# Patient Record
Sex: MALE | Race: WHITE | NOT HISPANIC OR LATINO | Employment: FULL TIME | URBAN - METROPOLITAN AREA
[De-identification: names, ages, dates, MRNs, and addresses within clinical notes are randomized per-mention and may not be internally consistent; named-entity substitution may affect disease eponyms.]

---

## 2021-05-28 ENCOUNTER — HOSPITAL ENCOUNTER (EMERGENCY)
Facility: HOSPITAL | Age: 31
Discharge: HOME | End: 2021-05-28
Attending: EMERGENCY MEDICINE
Payer: COMMERCIAL

## 2021-05-28 VITALS
HEART RATE: 100 BPM | HEIGHT: 71 IN | OXYGEN SATURATION: 98 % | WEIGHT: 200 LBS | TEMPERATURE: 99 F | RESPIRATION RATE: 18 BRPM | BODY MASS INDEX: 28 KG/M2 | DIASTOLIC BLOOD PRESSURE: 76 MMHG | SYSTOLIC BLOOD PRESSURE: 137 MMHG

## 2021-05-28 DIAGNOSIS — R07.9 CHEST PAIN: Primary | ICD-10-CM

## 2021-05-28 DIAGNOSIS — R73.9 HYPERGLYCEMIA: ICD-10-CM

## 2021-05-28 DIAGNOSIS — R05.9 COUGH: ICD-10-CM

## 2021-05-28 LAB
ALBUMIN SERPL BCP-MCNC: 3.6 G/DL (ref 3.5–5.2)
ALBUMIN SERPL BCP-MCNC: 4.6 G/DL (ref 3.5–5.2)
ALLENS TEST: ABNORMAL
ALP SERPL-CCNC: 104 U/L (ref 55–135)
ALP SERPL-CCNC: 82 U/L (ref 55–135)
ALT SERPL W/O P-5'-P-CCNC: 11 U/L (ref 10–44)
ALT SERPL W/O P-5'-P-CCNC: 13 U/L (ref 10–44)
ANION GAP SERPL CALC-SCNC: 14 MMOL/L (ref 8–16)
ANION GAP SERPL CALC-SCNC: 18 MMOL/L (ref 8–16)
AST SERPL-CCNC: 15 U/L (ref 10–40)
AST SERPL-CCNC: 9 U/L (ref 10–40)
BASOPHILS # BLD AUTO: 0.03 K/UL (ref 0–0.2)
BASOPHILS NFR BLD: 0.5 % (ref 0–1.9)
BILIRUB SERPL-MCNC: 0.7 MG/DL (ref 0.1–1)
BILIRUB SERPL-MCNC: 1.1 MG/DL (ref 0.1–1)
BNP SERPL-MCNC: <10 PG/ML (ref 0–99)
BUN SERPL-MCNC: 10 MG/DL (ref 6–20)
BUN SERPL-MCNC: 10 MG/DL (ref 6–20)
BUN SERPL-MCNC: 11 MG/DL (ref 6–30)
CALCIUM SERPL-MCNC: 10.4 MG/DL (ref 8.7–10.5)
CALCIUM SERPL-MCNC: 9.1 MG/DL (ref 8.7–10.5)
CHLORIDE SERPL-SCNC: 103 MMOL/L (ref 95–110)
CHLORIDE SERPL-SCNC: 98 MMOL/L (ref 95–110)
CHLORIDE SERPL-SCNC: 99 MMOL/L (ref 95–110)
CO2 SERPL-SCNC: 20 MMOL/L (ref 23–29)
CO2 SERPL-SCNC: 22 MMOL/L (ref 23–29)
CREAT SERPL-MCNC: 0.8 MG/DL (ref 0.5–1.4)
CREAT SERPL-MCNC: 0.9 MG/DL (ref 0.5–1.4)
CREAT SERPL-MCNC: 1.1 MG/DL (ref 0.5–1.4)
DELSYS: ABNORMAL
DIFFERENTIAL METHOD: ABNORMAL
EOSINOPHIL # BLD AUTO: 0.1 K/UL (ref 0–0.5)
EOSINOPHIL NFR BLD: 1.1 % (ref 0–8)
ERYTHROCYTE [DISTWIDTH] IN BLOOD BY AUTOMATED COUNT: 11.9 % (ref 11.5–14.5)
EST. GFR  (AFRICAN AMERICAN): >60 ML/MIN/1.73 M^2
EST. GFR  (AFRICAN AMERICAN): >60 ML/MIN/1.73 M^2
EST. GFR  (NON AFRICAN AMERICAN): >60 ML/MIN/1.73 M^2
EST. GFR  (NON AFRICAN AMERICAN): >60 ML/MIN/1.73 M^2
GLUCOSE SERPL-MCNC: 196 MG/DL (ref 70–110)
GLUCOSE SERPL-MCNC: 255 MG/DL (ref 70–110)
GLUCOSE SERPL-MCNC: 255 MG/DL (ref 70–110)
HCO3 UR-SCNC: 27.3 MMOL/L (ref 24–28)
HCT VFR BLD AUTO: 45.6 % (ref 40–54)
HCT VFR BLD CALC: 48 %PCV (ref 36–54)
HGB BLD-MCNC: 15.4 G/DL (ref 14–18)
IMM GRANULOCYTES # BLD AUTO: 0.02 K/UL (ref 0–0.04)
IMM GRANULOCYTES NFR BLD AUTO: 0.3 % (ref 0–0.5)
LYMPHOCYTES # BLD AUTO: 1.1 K/UL (ref 1–4.8)
LYMPHOCYTES NFR BLD: 17.1 % (ref 18–48)
MCH RBC QN AUTO: 30.6 PG (ref 27–31)
MCHC RBC AUTO-ENTMCNC: 33.8 G/DL (ref 32–36)
MCV RBC AUTO: 91 FL (ref 82–98)
MONOCYTES # BLD AUTO: 0.7 K/UL (ref 0.3–1)
MONOCYTES NFR BLD: 10.1 % (ref 4–15)
NEUTROPHILS # BLD AUTO: 4.7 K/UL (ref 1.8–7.7)
NEUTROPHILS NFR BLD: 70.9 % (ref 38–73)
NRBC BLD-RTO: 0 /100 WBC
PCO2 BLDA: 47.9 MMHG (ref 35–45)
PH SMN: 7.36 [PH] (ref 7.35–7.45)
PLATELET # BLD AUTO: 203 K/UL (ref 150–450)
PMV BLD AUTO: 11.6 FL (ref 9.2–12.9)
PO2 BLDA: 27 MMHG (ref 40–60)
POC BE: 2 MMOL/L
POC IONIZED CALCIUM: 1.2 MMOL/L (ref 1.06–1.42)
POC SATURATED O2: 48 % (ref 95–100)
POC TCO2 (MEASURED): 24 MMOL/L (ref 23–29)
POC TCO2: 29 MMOL/L (ref 24–29)
POTASSIUM BLD-SCNC: 4.4 MMOL/L (ref 3.5–5.1)
POTASSIUM SERPL-SCNC: 3.8 MMOL/L (ref 3.5–5.1)
POTASSIUM SERPL-SCNC: 4.4 MMOL/L (ref 3.5–5.1)
PROT SERPL-MCNC: 6.6 G/DL (ref 6–8.4)
PROT SERPL-MCNC: 8.4 G/DL (ref 6–8.4)
RBC # BLD AUTO: 5.04 M/UL (ref 4.6–6.2)
SAMPLE: ABNORMAL
SAMPLE: ABNORMAL
SITE: ABNORMAL
SODIUM BLD-SCNC: 138 MMOL/L (ref 136–145)
SODIUM SERPL-SCNC: 137 MMOL/L (ref 136–145)
SODIUM SERPL-SCNC: 139 MMOL/L (ref 136–145)
TROPONIN I SERPL DL<=0.01 NG/ML-MCNC: <0.006 NG/ML (ref 0–0.03)
WBC # BLD AUTO: 6.61 K/UL (ref 3.9–12.7)

## 2021-05-28 PROCEDURE — 96361 HYDRATE IV INFUSION ADD-ON: CPT

## 2021-05-28 PROCEDURE — 25500020 PHARM REV CODE 255: Performed by: PHYSICIAN ASSISTANT

## 2021-05-28 PROCEDURE — 84484 ASSAY OF TROPONIN QUANT: CPT | Performed by: PHYSICIAN ASSISTANT

## 2021-05-28 PROCEDURE — 93010 ELECTROCARDIOGRAM REPORT: CPT | Mod: ,,, | Performed by: INTERNAL MEDICINE

## 2021-05-28 PROCEDURE — 99285 EMERGENCY DEPT VISIT HI MDM: CPT | Mod: ,,, | Performed by: PHYSICIAN ASSISTANT

## 2021-05-28 PROCEDURE — 82803 BLOOD GASES ANY COMBINATION: CPT

## 2021-05-28 PROCEDURE — 83880 ASSAY OF NATRIURETIC PEPTIDE: CPT | Performed by: PHYSICIAN ASSISTANT

## 2021-05-28 PROCEDURE — 85025 COMPLETE CBC W/AUTO DIFF WBC: CPT | Performed by: PHYSICIAN ASSISTANT

## 2021-05-28 PROCEDURE — 93010 EKG 12-LEAD: ICD-10-PCS | Mod: ,,, | Performed by: INTERNAL MEDICINE

## 2021-05-28 PROCEDURE — 93005 ELECTROCARDIOGRAM TRACING: CPT

## 2021-05-28 PROCEDURE — 82330 ASSAY OF CALCIUM: CPT

## 2021-05-28 PROCEDURE — 99900035 HC TECH TIME PER 15 MIN (STAT)

## 2021-05-28 PROCEDURE — 99285 PR EMERGENCY DEPT VISIT,LEVEL V: ICD-10-PCS | Mod: ,,, | Performed by: PHYSICIAN ASSISTANT

## 2021-05-28 PROCEDURE — 80047 BASIC METABLC PNL IONIZED CA: CPT

## 2021-05-28 PROCEDURE — 63600175 PHARM REV CODE 636 W HCPCS: Performed by: PHYSICIAN ASSISTANT

## 2021-05-28 PROCEDURE — 80053 COMPREHEN METABOLIC PANEL: CPT | Performed by: PHYSICIAN ASSISTANT

## 2021-05-28 PROCEDURE — 96374 THER/PROPH/DIAG INJ IV PUSH: CPT | Mod: 59

## 2021-05-28 PROCEDURE — 99285 EMERGENCY DEPT VISIT HI MDM: CPT | Mod: 25

## 2021-05-28 RX ORDER — NAPROXEN 500 MG/1
500 TABLET ORAL 2 TIMES DAILY WITH MEALS
Qty: 20 TABLET | Refills: 0 | Status: SHIPPED | OUTPATIENT
Start: 2021-05-28 | End: 2023-02-24

## 2021-05-28 RX ORDER — BENZONATATE 100 MG/1
100 CAPSULE ORAL 3 TIMES DAILY PRN
Qty: 20 CAPSULE | Refills: 0 | Status: SHIPPED | OUTPATIENT
Start: 2021-05-28 | End: 2021-06-07

## 2021-05-28 RX ORDER — KETOROLAC TROMETHAMINE 30 MG/ML
10 INJECTION, SOLUTION INTRAMUSCULAR; INTRAVENOUS
Status: COMPLETED | OUTPATIENT
Start: 2021-05-28 | End: 2021-05-28

## 2021-05-28 RX ORDER — METFORMIN HYDROCHLORIDE 500 MG/1
500 TABLET ORAL 2 TIMES DAILY WITH MEALS
Qty: 30 TABLET | Refills: 0 | Status: SHIPPED | OUTPATIENT
Start: 2021-05-28 | End: 2021-06-08

## 2021-05-28 RX ADMIN — KETOROLAC TROMETHAMINE 10 MG: 30 INJECTION, SOLUTION INTRAMUSCULAR; INTRAVENOUS at 06:05

## 2021-05-28 RX ADMIN — SODIUM CHLORIDE, SODIUM LACTATE, POTASSIUM CHLORIDE, AND CALCIUM CHLORIDE 1000 ML: .6; .31; .03; .02 INJECTION, SOLUTION INTRAVENOUS at 09:05

## 2021-05-28 RX ADMIN — IOHEXOL 100 ML: 350 INJECTION, SOLUTION INTRAVENOUS at 07:05

## 2021-05-31 ENCOUNTER — TELEPHONE (OUTPATIENT)
Dept: FAMILY MEDICINE | Facility: CLINIC | Age: 31
End: 2021-05-31

## 2021-06-01 ENCOUNTER — OFFICE VISIT (OUTPATIENT)
Dept: INTERNAL MEDICINE | Facility: CLINIC | Age: 31
End: 2021-06-01
Payer: COMMERCIAL

## 2021-06-01 ENCOUNTER — TELEPHONE (OUTPATIENT)
Dept: DIABETES | Facility: CLINIC | Age: 31
End: 2021-06-01

## 2021-06-01 VITALS
DIASTOLIC BLOOD PRESSURE: 82 MMHG | HEIGHT: 69 IN | WEIGHT: 196.19 LBS | TEMPERATURE: 98 F | BODY MASS INDEX: 29.06 KG/M2 | SYSTOLIC BLOOD PRESSURE: 122 MMHG | OXYGEN SATURATION: 100 % | HEART RATE: 91 BPM

## 2021-06-01 DIAGNOSIS — R05.3 CHRONIC COUGH: ICD-10-CM

## 2021-06-01 DIAGNOSIS — E11.9 TYPE 2 DIABETES MELLITUS WITHOUT COMPLICATION, WITHOUT LONG-TERM CURRENT USE OF INSULIN: ICD-10-CM

## 2021-06-01 DIAGNOSIS — Z00.00 ANNUAL PHYSICAL EXAM: Primary | ICD-10-CM

## 2021-06-01 PROCEDURE — 3008F BODY MASS INDEX DOCD: CPT | Mod: CPTII,S$GLB,, | Performed by: FAMILY MEDICINE

## 2021-06-01 PROCEDURE — 99999 PR PBB SHADOW E&M-EST. PATIENT-LVL IV: ICD-10-PCS | Mod: PBBFAC,,, | Performed by: FAMILY MEDICINE

## 2021-06-01 PROCEDURE — 99385 PR PREVENTIVE VISIT,NEW,18-39: ICD-10-PCS | Mod: S$GLB,,, | Performed by: FAMILY MEDICINE

## 2021-06-01 PROCEDURE — 3008F PR BODY MASS INDEX (BMI) DOCUMENTED: ICD-10-PCS | Mod: CPTII,S$GLB,, | Performed by: FAMILY MEDICINE

## 2021-06-01 PROCEDURE — 99999 PR PBB SHADOW E&M-EST. PATIENT-LVL IV: CPT | Mod: PBBFAC,,, | Performed by: FAMILY MEDICINE

## 2021-06-01 PROCEDURE — 99385 PREV VISIT NEW AGE 18-39: CPT | Mod: S$GLB,,, | Performed by: FAMILY MEDICINE

## 2021-06-01 PROCEDURE — 1126F PR PAIN SEVERITY QUANTIFIED, NO PAIN PRESENT: ICD-10-PCS | Mod: S$GLB,,, | Performed by: FAMILY MEDICINE

## 2021-06-01 PROCEDURE — 1126F AMNT PAIN NOTED NONE PRSNT: CPT | Mod: S$GLB,,, | Performed by: FAMILY MEDICINE

## 2021-06-01 RX ORDER — AMOXICILLIN 875 MG/1
875 TABLET, FILM COATED ORAL 2 TIMES DAILY
COMMUNITY
Start: 2021-05-29 | End: 2021-08-02 | Stop reason: ALTCHOICE

## 2021-06-07 ENCOUNTER — LAB VISIT (OUTPATIENT)
Dept: LAB | Facility: HOSPITAL | Age: 31
End: 2021-06-07
Attending: FAMILY MEDICINE
Payer: COMMERCIAL

## 2021-06-07 DIAGNOSIS — Z00.00 ANNUAL PHYSICAL EXAM: ICD-10-CM

## 2021-06-07 DIAGNOSIS — E11.9 TYPE 2 DIABETES MELLITUS WITHOUT COMPLICATION, WITHOUT LONG-TERM CURRENT USE OF INSULIN: ICD-10-CM

## 2021-06-07 LAB
25(OH)D3+25(OH)D2 SERPL-MCNC: 22 NG/ML (ref 30–96)
ALBUMIN SERPL BCP-MCNC: 4.3 G/DL (ref 3.5–5.2)
ALP SERPL-CCNC: 72 U/L (ref 55–135)
ALT SERPL W/O P-5'-P-CCNC: 16 U/L (ref 10–44)
ANION GAP SERPL CALC-SCNC: 11 MMOL/L (ref 8–16)
AST SERPL-CCNC: 17 U/L (ref 10–40)
BASOPHILS # BLD AUTO: 0.04 K/UL (ref 0–0.2)
BASOPHILS NFR BLD: 0.8 % (ref 0–1.9)
BILIRUB SERPL-MCNC: 0.6 MG/DL (ref 0.1–1)
BUN SERPL-MCNC: 11 MG/DL (ref 6–20)
CALCIUM SERPL-MCNC: 9.9 MG/DL (ref 8.7–10.5)
CHLORIDE SERPL-SCNC: 99 MMOL/L (ref 95–110)
CHOLEST SERPL-MCNC: 157 MG/DL (ref 120–199)
CHOLEST/HDLC SERPL: 3.5 {RATIO} (ref 2–5)
CO2 SERPL-SCNC: 26 MMOL/L (ref 23–29)
CREAT SERPL-MCNC: 1 MG/DL (ref 0.5–1.4)
DIFFERENTIAL METHOD: ABNORMAL
EOSINOPHIL # BLD AUTO: 0.1 K/UL (ref 0–0.5)
EOSINOPHIL NFR BLD: 2.6 % (ref 0–8)
ERYTHROCYTE [DISTWIDTH] IN BLOOD BY AUTOMATED COUNT: 12.3 % (ref 11.5–14.5)
EST. GFR  (AFRICAN AMERICAN): >60 ML/MIN/1.73 M^2
EST. GFR  (NON AFRICAN AMERICAN): >60 ML/MIN/1.73 M^2
ESTIMATED AVG GLUCOSE: 235 MG/DL (ref 68–131)
GLUCOSE SERPL-MCNC: 244 MG/DL (ref 70–110)
HBA1C MFR BLD: 9.8 % (ref 4–5.6)
HCT VFR BLD AUTO: 44.4 % (ref 40–54)
HDLC SERPL-MCNC: 45 MG/DL (ref 40–75)
HDLC SERPL: 28.7 % (ref 20–50)
HGB BLD-MCNC: 14.6 G/DL (ref 14–18)
IMM GRANULOCYTES # BLD AUTO: 0.03 K/UL (ref 0–0.04)
IMM GRANULOCYTES NFR BLD AUTO: 0.6 % (ref 0–0.5)
LDLC SERPL CALC-MCNC: 70 MG/DL (ref 63–159)
LYMPHOCYTES # BLD AUTO: 2.3 K/UL (ref 1–4.8)
LYMPHOCYTES NFR BLD: 46 % (ref 18–48)
MCH RBC QN AUTO: 30.4 PG (ref 27–31)
MCHC RBC AUTO-ENTMCNC: 32.9 G/DL (ref 32–36)
MCV RBC AUTO: 93 FL (ref 82–98)
MONOCYTES # BLD AUTO: 0.3 K/UL (ref 0.3–1)
MONOCYTES NFR BLD: 6.2 % (ref 4–15)
NEUTROPHILS # BLD AUTO: 2.2 K/UL (ref 1.8–7.7)
NEUTROPHILS NFR BLD: 43.8 % (ref 38–73)
NONHDLC SERPL-MCNC: 112 MG/DL
NRBC BLD-RTO: 0 /100 WBC
PLATELET # BLD AUTO: 218 K/UL (ref 150–450)
PMV BLD AUTO: 11.7 FL (ref 9.2–12.9)
POTASSIUM SERPL-SCNC: 3.8 MMOL/L (ref 3.5–5.1)
PROT SERPL-MCNC: 7.4 G/DL (ref 6–8.4)
RBC # BLD AUTO: 4.8 M/UL (ref 4.6–6.2)
SODIUM SERPL-SCNC: 136 MMOL/L (ref 136–145)
T4 FREE SERPL-MCNC: 1.09 NG/DL (ref 0.71–1.51)
TRIGL SERPL-MCNC: 210 MG/DL (ref 30–150)
TSH SERPL DL<=0.005 MIU/L-ACNC: 4.27 UIU/ML (ref 0.4–4)
WBC # BLD AUTO: 5.04 K/UL (ref 3.9–12.7)

## 2021-06-07 PROCEDURE — 83036 HEMOGLOBIN GLYCOSYLATED A1C: CPT | Performed by: FAMILY MEDICINE

## 2021-06-07 PROCEDURE — 85025 COMPLETE CBC W/AUTO DIFF WBC: CPT | Performed by: FAMILY MEDICINE

## 2021-06-07 PROCEDURE — 84439 ASSAY OF FREE THYROXINE: CPT | Performed by: FAMILY MEDICINE

## 2021-06-07 PROCEDURE — 80061 LIPID PANEL: CPT | Performed by: FAMILY MEDICINE

## 2021-06-07 PROCEDURE — 36415 COLL VENOUS BLD VENIPUNCTURE: CPT | Mod: PO | Performed by: FAMILY MEDICINE

## 2021-06-07 PROCEDURE — 84443 ASSAY THYROID STIM HORMONE: CPT | Performed by: FAMILY MEDICINE

## 2021-06-07 PROCEDURE — 80053 COMPREHEN METABOLIC PANEL: CPT | Performed by: FAMILY MEDICINE

## 2021-06-07 PROCEDURE — 82306 VITAMIN D 25 HYDROXY: CPT | Performed by: FAMILY MEDICINE

## 2021-06-08 ENCOUNTER — PATIENT MESSAGE (OUTPATIENT)
Dept: INTERNAL MEDICINE | Facility: CLINIC | Age: 31
End: 2021-06-08

## 2021-06-08 DIAGNOSIS — E55.9 VITAMIN D INSUFFICIENCY: ICD-10-CM

## 2021-06-08 DIAGNOSIS — E11.9 TYPE 2 DIABETES MELLITUS WITHOUT COMPLICATION, WITHOUT LONG-TERM CURRENT USE OF INSULIN: Primary | ICD-10-CM

## 2021-06-08 DIAGNOSIS — E11.69 HYPERLIPIDEMIA ASSOCIATED WITH TYPE 2 DIABETES MELLITUS: ICD-10-CM

## 2021-06-08 DIAGNOSIS — E78.5 HYPERLIPIDEMIA ASSOCIATED WITH TYPE 2 DIABETES MELLITUS: ICD-10-CM

## 2021-06-08 RX ORDER — ERGOCALCIFEROL 1.25 MG/1
50000 CAPSULE ORAL
Qty: 4 CAPSULE | Refills: 2 | Status: SHIPPED | OUTPATIENT
Start: 2021-06-08 | End: 2021-07-08

## 2021-06-08 RX ORDER — SIMVASTATIN 20 MG/1
20 TABLET, FILM COATED ORAL NIGHTLY
Qty: 30 TABLET | Refills: 11 | Status: SHIPPED | OUTPATIENT
Start: 2021-06-08 | End: 2023-02-24

## 2021-06-08 RX ORDER — METFORMIN HYDROCHLORIDE 500 MG/1
1000 TABLET ORAL 2 TIMES DAILY WITH MEALS
Qty: 120 TABLET | Refills: 11 | Status: SHIPPED | OUTPATIENT
Start: 2021-06-08 | End: 2021-06-25

## 2021-06-25 ENCOUNTER — LAB VISIT (OUTPATIENT)
Dept: LAB | Facility: HOSPITAL | Age: 31
End: 2021-06-25
Attending: NURSE PRACTITIONER
Payer: COMMERCIAL

## 2021-06-25 ENCOUNTER — OFFICE VISIT (OUTPATIENT)
Dept: INTERNAL MEDICINE | Facility: CLINIC | Age: 31
End: 2021-06-25
Payer: COMMERCIAL

## 2021-06-25 VITALS
HEIGHT: 71 IN | RESPIRATION RATE: 16 BRPM | OXYGEN SATURATION: 98 % | HEART RATE: 82 BPM | WEIGHT: 193.56 LBS | DIASTOLIC BLOOD PRESSURE: 80 MMHG | SYSTOLIC BLOOD PRESSURE: 122 MMHG | TEMPERATURE: 97 F | BODY MASS INDEX: 27.1 KG/M2

## 2021-06-25 DIAGNOSIS — E78.5 HYPERLIPIDEMIA ASSOCIATED WITH TYPE 2 DIABETES MELLITUS: ICD-10-CM

## 2021-06-25 DIAGNOSIS — E55.9 VITAMIN D INSUFFICIENCY: ICD-10-CM

## 2021-06-25 DIAGNOSIS — E11.69 HYPERLIPIDEMIA ASSOCIATED WITH TYPE 2 DIABETES MELLITUS: ICD-10-CM

## 2021-06-25 DIAGNOSIS — E11.9 TYPE 2 DIABETES MELLITUS WITHOUT COMPLICATION, WITHOUT LONG-TERM CURRENT USE OF INSULIN: ICD-10-CM

## 2021-06-25 DIAGNOSIS — E11.9 TYPE 2 DIABETES MELLITUS WITHOUT COMPLICATION, WITHOUT LONG-TERM CURRENT USE OF INSULIN: Primary | ICD-10-CM

## 2021-06-25 LAB
ALBUMIN SERPL BCP-MCNC: 4.4 G/DL (ref 3.5–5.2)
ALP SERPL-CCNC: 81 U/L (ref 55–135)
ALT SERPL W/O P-5'-P-CCNC: 13 U/L (ref 10–44)
ANION GAP SERPL CALC-SCNC: 11 MMOL/L (ref 8–16)
AST SERPL-CCNC: 13 U/L (ref 10–40)
BILIRUB SERPL-MCNC: 0.9 MG/DL (ref 0.1–1)
BUN SERPL-MCNC: 15 MG/DL (ref 6–20)
C PEPTIDE SERPL-MCNC: 1.07 NG/ML (ref 0.78–5.19)
CALCIUM SERPL-MCNC: 10.1 MG/DL (ref 8.7–10.5)
CHLORIDE SERPL-SCNC: 100 MMOL/L (ref 95–110)
CO2 SERPL-SCNC: 26 MMOL/L (ref 23–29)
CREAT SERPL-MCNC: 1 MG/DL (ref 0.5–1.4)
EST. GFR  (AFRICAN AMERICAN): >60 ML/MIN/1.73 M^2
EST. GFR  (NON AFRICAN AMERICAN): >60 ML/MIN/1.73 M^2
GLUCOSE SERPL-MCNC: 255 MG/DL (ref 70–110)
GLUCOSE SERPL-MCNC: 293 MG/DL (ref 70–110)
POTASSIUM SERPL-SCNC: 4.1 MMOL/L (ref 3.5–5.1)
PROT SERPL-MCNC: 7.6 G/DL (ref 6–8.4)
SODIUM SERPL-SCNC: 137 MMOL/L (ref 136–145)

## 2021-06-25 PROCEDURE — 99999 PR PBB SHADOW E&M-EST. PATIENT-LVL V: ICD-10-PCS | Mod: PBBFAC,,, | Performed by: NURSE PRACTITIONER

## 2021-06-25 PROCEDURE — 99215 PR OFFICE/OUTPT VISIT, EST, LEVL V, 40-54 MIN: ICD-10-PCS | Mod: S$GLB,,, | Performed by: NURSE PRACTITIONER

## 2021-06-25 PROCEDURE — 99215 OFFICE O/P EST HI 40 MIN: CPT | Mod: S$GLB,,, | Performed by: NURSE PRACTITIONER

## 2021-06-25 PROCEDURE — 99999 PR PBB SHADOW E&M-EST. PATIENT-LVL V: CPT | Mod: PBBFAC,,, | Performed by: NURSE PRACTITIONER

## 2021-06-25 PROCEDURE — 86341 ISLET CELL ANTIBODY: CPT | Mod: 91 | Performed by: NURSE PRACTITIONER

## 2021-06-25 PROCEDURE — 84681 ASSAY OF C-PEPTIDE: CPT | Performed by: NURSE PRACTITIONER

## 2021-06-25 PROCEDURE — 82962 GLUCOSE BLOOD TEST: CPT | Mod: PBBFAC,PO | Performed by: NURSE PRACTITIONER

## 2021-06-25 PROCEDURE — 80053 COMPREHEN METABOLIC PANEL: CPT | Performed by: NURSE PRACTITIONER

## 2021-06-25 PROCEDURE — 86341 ISLET CELL ANTIBODY: CPT | Performed by: NURSE PRACTITIONER

## 2021-06-25 RX ORDER — DAPAGLIFLOZIN AND METFORMIN HYDROCHLORIDE 5; 1000 MG/1; MG/1
1 TABLET, FILM COATED, EXTENDED RELEASE ORAL EVERY MORNING
Qty: 30 TABLET | Refills: 3 | Status: SHIPPED | OUTPATIENT
Start: 2021-06-25 | End: 2021-10-12 | Stop reason: SDUPTHER

## 2021-06-25 RX ORDER — LANCETS
1 EACH MISCELLANEOUS 2 TIMES DAILY
Qty: 100 EACH | Refills: 6 | Status: SHIPPED | OUTPATIENT
Start: 2021-06-25 | End: 2021-07-25

## 2021-06-25 RX ORDER — INSULIN PUMP SYRINGE, 3 ML
EACH MISCELLANEOUS
Qty: 1 EACH | Refills: 0 | Status: SHIPPED | OUTPATIENT
Start: 2021-06-25

## 2021-06-29 LAB — PANC ISLET CELL IGG SER-ACNC: NORMAL

## 2021-06-30 ENCOUNTER — TELEPHONE (OUTPATIENT)
Dept: INTERNAL MEDICINE | Facility: CLINIC | Age: 31
End: 2021-06-30

## 2021-06-30 ENCOUNTER — PATIENT OUTREACH (OUTPATIENT)
Dept: ADMINISTRATIVE | Facility: OTHER | Age: 31
End: 2021-06-30

## 2021-06-30 LAB — GAD65 AB SER-SCNC: 0.01 NMOL/L

## 2021-07-01 ENCOUNTER — OFFICE VISIT (OUTPATIENT)
Dept: OPTOMETRY | Facility: CLINIC | Age: 31
End: 2021-07-01
Payer: COMMERCIAL

## 2021-07-01 DIAGNOSIS — E11.9 TYPE 2 DIABETES MELLITUS WITHOUT RETINOPATHY: Primary | ICD-10-CM

## 2021-07-01 DIAGNOSIS — E11.9 TYPE 2 DIABETES MELLITUS WITHOUT COMPLICATION, WITHOUT LONG-TERM CURRENT USE OF INSULIN: ICD-10-CM

## 2021-07-01 PROCEDURE — 2023F PR DILATED RETINAL EXAM W/O EVID OF RETINOPATHY: ICD-10-PCS | Mod: CPTII,S$GLB,, | Performed by: OPTOMETRIST

## 2021-07-01 PROCEDURE — 99999 PR PBB SHADOW E&M-EST. PATIENT-LVL III: CPT | Mod: PBBFAC,,, | Performed by: OPTOMETRIST

## 2021-07-01 PROCEDURE — 99213 OFFICE O/P EST LOW 20 MIN: CPT | Mod: PBBFAC,PO | Performed by: OPTOMETRIST

## 2021-07-01 PROCEDURE — 2023F DILAT RTA XM W/O RTNOPTHY: CPT | Mod: CPTII,S$GLB,, | Performed by: OPTOMETRIST

## 2021-07-01 PROCEDURE — 92004 PR EYE EXAM, NEW PATIENT,COMPREHESV: ICD-10-PCS | Mod: S$GLB,,, | Performed by: OPTOMETRIST

## 2021-07-01 PROCEDURE — 92004 COMPRE OPH EXAM NEW PT 1/>: CPT | Mod: S$GLB,,, | Performed by: OPTOMETRIST

## 2021-07-01 PROCEDURE — 99999 PR PBB SHADOW E&M-EST. PATIENT-LVL III: ICD-10-PCS | Mod: PBBFAC,,, | Performed by: OPTOMETRIST

## 2021-07-16 ENCOUNTER — CLINICAL SUPPORT (OUTPATIENT)
Dept: DIABETES | Facility: CLINIC | Age: 31
End: 2021-07-16
Payer: COMMERCIAL

## 2021-07-16 DIAGNOSIS — E11.9 TYPE 2 DIABETES MELLITUS WITHOUT COMPLICATION, WITHOUT LONG-TERM CURRENT USE OF INSULIN: ICD-10-CM

## 2021-07-16 PROCEDURE — G0108 DIAB MANAGE TRN  PER INDIV: HCPCS | Mod: PBBFAC,PO | Performed by: DIETITIAN, REGISTERED

## 2021-08-02 ENCOUNTER — OFFICE VISIT (OUTPATIENT)
Dept: INTERNAL MEDICINE | Facility: CLINIC | Age: 31
End: 2021-08-02
Payer: COMMERCIAL

## 2021-08-02 VITALS
HEART RATE: 80 BPM | TEMPERATURE: 98 F | HEIGHT: 71 IN | RESPIRATION RATE: 15 BRPM | WEIGHT: 198.19 LBS | BODY MASS INDEX: 27.75 KG/M2 | SYSTOLIC BLOOD PRESSURE: 120 MMHG | DIASTOLIC BLOOD PRESSURE: 84 MMHG

## 2021-08-02 DIAGNOSIS — Z09 FOLLOW UP: Primary | ICD-10-CM

## 2021-08-02 DIAGNOSIS — E11.9 TYPE 2 DIABETES MELLITUS WITHOUT COMPLICATION, WITHOUT LONG-TERM CURRENT USE OF INSULIN: ICD-10-CM

## 2021-08-02 PROCEDURE — 99999 PR PBB SHADOW E&M-EST. PATIENT-LVL III: CPT | Mod: PBBFAC,,, | Performed by: FAMILY MEDICINE

## 2021-08-02 PROCEDURE — 99395 PREV VISIT EST AGE 18-39: CPT | Mod: S$GLB,,, | Performed by: FAMILY MEDICINE

## 2021-08-02 PROCEDURE — 99395 PR PREVENTIVE VISIT,EST,18-39: ICD-10-PCS | Mod: S$GLB,,, | Performed by: FAMILY MEDICINE

## 2021-08-02 PROCEDURE — 99999 PR PBB SHADOW E&M-EST. PATIENT-LVL III: ICD-10-PCS | Mod: PBBFAC,,, | Performed by: FAMILY MEDICINE

## 2021-08-05 ENCOUNTER — OFFICE VISIT (OUTPATIENT)
Dept: INTERNAL MEDICINE | Facility: CLINIC | Age: 31
End: 2021-08-05
Payer: COMMERCIAL

## 2021-08-05 VITALS
RESPIRATION RATE: 16 BRPM | OXYGEN SATURATION: 99 % | TEMPERATURE: 98 F | WEIGHT: 195.56 LBS | HEIGHT: 71 IN | DIASTOLIC BLOOD PRESSURE: 80 MMHG | SYSTOLIC BLOOD PRESSURE: 118 MMHG | HEART RATE: 92 BPM | BODY MASS INDEX: 27.38 KG/M2

## 2021-08-05 DIAGNOSIS — E78.5 HYPERLIPIDEMIA ASSOCIATED WITH TYPE 2 DIABETES MELLITUS: ICD-10-CM

## 2021-08-05 DIAGNOSIS — E11.69 HYPERLIPIDEMIA ASSOCIATED WITH TYPE 2 DIABETES MELLITUS: ICD-10-CM

## 2021-08-05 DIAGNOSIS — E11.9 TYPE 2 DIABETES MELLITUS WITHOUT COMPLICATION, WITHOUT LONG-TERM CURRENT USE OF INSULIN: Primary | ICD-10-CM

## 2021-08-05 LAB — GLUCOSE SERPL-MCNC: 220 MG/DL (ref 70–110)

## 2021-08-05 PROCEDURE — 82962 GLUCOSE BLOOD TEST: CPT | Mod: PBBFAC,PO | Performed by: NURSE PRACTITIONER

## 2021-08-05 PROCEDURE — 99214 OFFICE O/P EST MOD 30 MIN: CPT | Mod: S$GLB,,, | Performed by: NURSE PRACTITIONER

## 2021-08-05 PROCEDURE — 99214 PR OFFICE/OUTPT VISIT, EST, LEVL IV, 30-39 MIN: ICD-10-PCS | Mod: S$GLB,,, | Performed by: NURSE PRACTITIONER

## 2021-08-05 PROCEDURE — 99999 PR PBB SHADOW E&M-EST. PATIENT-LVL IV: ICD-10-PCS | Mod: PBBFAC,,, | Performed by: NURSE PRACTITIONER

## 2021-08-05 PROCEDURE — 99999 PR PBB SHADOW E&M-EST. PATIENT-LVL IV: CPT | Mod: PBBFAC,,, | Performed by: NURSE PRACTITIONER

## 2021-10-12 ENCOUNTER — PATIENT MESSAGE (OUTPATIENT)
Dept: INTERNAL MEDICINE | Facility: CLINIC | Age: 31
End: 2021-10-12

## 2021-10-12 DIAGNOSIS — E11.9 TYPE 2 DIABETES MELLITUS WITHOUT COMPLICATION, WITHOUT LONG-TERM CURRENT USE OF INSULIN: Primary | ICD-10-CM

## 2021-10-13 RX ORDER — DAPAGLIFLOZIN AND METFORMIN HYDROCHLORIDE 5; 1000 MG/1; MG/1
1 TABLET, FILM COATED, EXTENDED RELEASE ORAL EVERY MORNING
Qty: 30 TABLET | Refills: 3 | Status: SHIPPED | OUTPATIENT
Start: 2021-10-13 | End: 2021-10-21

## 2021-10-13 RX ORDER — DAPAGLIFLOZIN AND METFORMIN HYDROCHLORIDE 5; 1000 MG/1; MG/1
1 TABLET, FILM COATED, EXTENDED RELEASE ORAL EVERY MORNING
Qty: 30 TABLET | Refills: 3 | Status: SHIPPED | OUTPATIENT
Start: 2021-10-13 | End: 2021-10-13 | Stop reason: SDUPTHER

## 2021-10-21 RX ORDER — EMPAGLIFLOZIN, METFORMIN HYDROCHLORIDE 12.5; 1 MG/1; MG/1
1 TABLET, EXTENDED RELEASE ORAL EVERY MORNING
Qty: 30 TABLET | Refills: 6 | Status: SHIPPED | OUTPATIENT
Start: 2021-10-21 | End: 2023-02-24

## 2021-10-27 ENCOUNTER — PATIENT MESSAGE (OUTPATIENT)
Dept: INTERNAL MEDICINE | Facility: CLINIC | Age: 31
End: 2021-10-27
Payer: COMMERCIAL

## 2022-01-25 ENCOUNTER — PATIENT MESSAGE (OUTPATIENT)
Dept: ADMINISTRATIVE | Facility: HOSPITAL | Age: 32
End: 2022-01-25
Payer: COMMERCIAL

## 2022-03-07 ENCOUNTER — PATIENT MESSAGE (OUTPATIENT)
Dept: ADMINISTRATIVE | Facility: HOSPITAL | Age: 32
End: 2022-03-07
Payer: COMMERCIAL

## 2022-04-12 ENCOUNTER — PATIENT OUTREACH (OUTPATIENT)
Dept: ADMINISTRATIVE | Facility: HOSPITAL | Age: 32
End: 2022-04-12
Payer: COMMERCIAL

## 2022-06-01 ENCOUNTER — PATIENT MESSAGE (OUTPATIENT)
Dept: ADMINISTRATIVE | Facility: HOSPITAL | Age: 32
End: 2022-06-01

## 2022-06-06 ENCOUNTER — PATIENT MESSAGE (OUTPATIENT)
Dept: ADMINISTRATIVE | Facility: HOSPITAL | Age: 32
End: 2022-06-06

## 2022-06-06 DIAGNOSIS — E11.9 TYPE 2 DIABETES MELLITUS WITHOUT COMPLICATION, UNSPECIFIED WHETHER LONG TERM INSULIN USE: ICD-10-CM

## 2022-06-08 DIAGNOSIS — E11.9 TYPE 2 DIABETES MELLITUS WITHOUT COMPLICATION: ICD-10-CM

## 2022-07-12 ENCOUNTER — PATIENT MESSAGE (OUTPATIENT)
Dept: ADMINISTRATIVE | Facility: HOSPITAL | Age: 32
End: 2022-07-12

## 2022-08-31 DIAGNOSIS — E11.69 HYPERLIPIDEMIA ASSOCIATED WITH TYPE 2 DIABETES MELLITUS: ICD-10-CM

## 2022-08-31 DIAGNOSIS — E78.5 HYPERLIPIDEMIA ASSOCIATED WITH TYPE 2 DIABETES MELLITUS: ICD-10-CM

## 2023-02-24 ENCOUNTER — OFFICE VISIT (OUTPATIENT)
Dept: FAMILY MEDICINE | Facility: CLINIC | Age: 33
End: 2023-02-24

## 2023-02-24 VITALS
WEIGHT: 184.31 LBS | OXYGEN SATURATION: 98 % | TEMPERATURE: 99 F | HEIGHT: 71 IN | SYSTOLIC BLOOD PRESSURE: 119 MMHG | DIASTOLIC BLOOD PRESSURE: 80 MMHG | BODY MASS INDEX: 25.8 KG/M2 | HEART RATE: 115 BPM | RESPIRATION RATE: 18 BRPM

## 2023-02-24 DIAGNOSIS — E11.65 TYPE 2 DIABETES MELLITUS WITH HYPERGLYCEMIA, WITHOUT LONG-TERM CURRENT USE OF INSULIN: Primary | ICD-10-CM

## 2023-02-24 PROCEDURE — 99213 OFFICE O/P EST LOW 20 MIN: CPT | Mod: S$PBB,,, | Performed by: INTERNAL MEDICINE

## 2023-02-24 PROCEDURE — 99999 PR PBB SHADOW E&M-EST. PATIENT-LVL III: ICD-10-PCS | Mod: PBBFAC,,, | Performed by: INTERNAL MEDICINE

## 2023-02-24 PROCEDURE — 99213 OFFICE O/P EST LOW 20 MIN: CPT | Mod: PBBFAC,PN | Performed by: INTERNAL MEDICINE

## 2023-02-24 PROCEDURE — 99213 PR OFFICE/OUTPT VISIT, EST, LEVL III, 20-29 MIN: ICD-10-PCS | Mod: S$PBB,,, | Performed by: INTERNAL MEDICINE

## 2023-02-24 PROCEDURE — 99999 PR PBB SHADOW E&M-EST. PATIENT-LVL III: CPT | Mod: PBBFAC,,, | Performed by: INTERNAL MEDICINE

## 2023-02-24 RX ORDER — DIPHENOXYLATE HYDROCHLORIDE AND ATROPINE SULFATE 2.5; .025 MG/1; MG/1
1 TABLET ORAL
Qty: 30 TABLET | Refills: 0 | Status: SHIPPED | OUTPATIENT
Start: 2023-02-24 | End: 2023-03-06

## 2023-02-24 RX ORDER — GLIPIZIDE 5 MG/1
5 TABLET ORAL
Qty: 60 TABLET | Refills: 0 | Status: SHIPPED | OUTPATIENT
Start: 2023-02-24 | End: 2023-09-12

## 2023-02-24 NOTE — PROGRESS NOTES
Subjective:       Patient ID: Nader aCrdona is a 32 y.o. male.    Chief Complaint: Abdominal Pain    Abdominal Pain  This is a chronic problem. The current episode started more than 1 year ago. The onset quality is gradual. The problem occurs every several days. The problem has been waxing and waning. The pain is located in the epigastric region. The pain is moderate. The quality of the pain is cramping and aching. The abdominal pain radiates to the epigastric region and back. Associated symptoms include diarrhea. Pertinent negatives include no anorexia, arthralgias, constipation, dysuria, fever, headaches, nausea or vomiting. The pain is aggravated by eating. The pain is relieved by Bowel movements. He has tried antacids (peptobismol) for the symptoms. The treatment provided no relief. There is no history of Crohn's disease or pancreatitis.     Diagnosed with diabetes in 2021, has not taken any medications in > 1 year. Did feel metformin made diarrhea worse. Finds two to three hours after eating needs to move bowels associated with epigastric cramping pain no nausea/vomitting no dysphagia symptoms present for > `1 year. Pain worse last night prompting him to make appointment today. Thinks weight down approximately 10lbs in last year.   Review of Systems   Constitutional:  Negative for activity change, fever and unexpected weight change.   HENT:  Negative for congestion, rhinorrhea, sore throat and trouble swallowing.    Eyes:  Negative for photophobia and redness.   Respiratory:  Negative for cough, chest tightness, shortness of breath and wheezing.    Cardiovascular:  Negative for chest pain, palpitations and leg swelling.   Gastrointestinal:  Positive for abdominal pain and diarrhea. Negative for anorexia, blood in stool, constipation, nausea and vomiting.   Endocrine: Negative for cold intolerance, heat intolerance and polyuria.   Genitourinary:  Negative for decreased urine volume, difficulty urinating,  "dysuria and urgency.   Musculoskeletal:  Negative for arthralgias and back pain.   Skin:  Negative for rash.   Neurological:  Negative for dizziness, syncope, weakness and headaches.   Psychiatric/Behavioral:  Negative for dysphoric mood, sleep disturbance and suicidal ideas.      Objective:     Vitals:    02/24/23 1326   BP: 119/80   Pulse: (!) 115   Resp: 18   Temp: 99 °F (37.2 °C)   TempSrc: Oral   SpO2: 98%   Weight: 83.6 kg (184 lb 4.9 oz)   Height: 5' 11" (1.803 m)          Physical Exam  Constitutional:       Appearance: He is well-developed.   HENT:      Head: Normocephalic and atraumatic.   Eyes:      General: No scleral icterus.     Conjunctiva/sclera: Conjunctivae normal.   Cardiovascular:      Rate and Rhythm: Normal rate and regular rhythm.      Heart sounds: No murmur heard.    No friction rub. No gallop.   Pulmonary:      Effort: Pulmonary effort is normal. No respiratory distress.      Breath sounds: Normal breath sounds. No wheezing or rales.   Abdominal:      General: Abdomen is flat. Bowel sounds are normal. There is no distension.      Palpations: Abdomen is soft.      Tenderness: There is no abdominal tenderness. There is no right CVA tenderness, left CVA tenderness, guarding or rebound.      Hernia: No hernia is present.   Musculoskeletal:         General: No tenderness. Normal range of motion.      Cervical back: Normal range of motion.      Right lower leg: No edema.      Left lower leg: No edema.   Skin:     General: Skin is warm and dry.   Neurological:      Mental Status: He is alert and oriented to person, place, and time.      Cranial Nerves: No cranial nerve deficit.       Assessment and Plan   1. Type 2 diabetes mellitus with hyperglycemia, without long-term current use of insulin  Glucose not consistent with DKA suspect losoe stools related to hyperglycemia and dumping. Start sulfayurea needs to establish with PCP, information given for [facility] prn lomotil, " abdominal exam reassuring no evidnece of rebound or guarding also given length of time with symptoms less likely infectious etiology  - POCT Glucose, Hand-Held Device  - glipiZIDE (GLUCOTROL) 5 MG tablet; Take 1 tablet (5 mg total) by mouth 2 (two) times daily before meals.  Dispense: 60 tablet; Refill: 0  - diphenoxylate-atropine 2.5-0.025 mg (LOMOTIL) 2.5-0.025 mg per tablet; Take 1 tablet by mouth before meals as needed for Diarrhea.  Dispense: 30 tablet; Refill: 0

## 2023-02-24 NOTE — LETTER
February 24, 2023    Nader Cardona  [address]  [place] [address]         [facility]  [address]  [place] [address]  Phone: [phone] February 24, 2023     Patient: Nader Cardona   YOB: 1990   Date of Visit: 2/24/2023       To Whom It May Concern:    It is my medical opinion that Nader Cardona was seen in clinic today. He may return to work without restriction.    If you have any questions or concerns, please don't hesitate to call.    Sincerely,        Tan Coker MD

## 2023-03-01 DIAGNOSIS — E11.9 TYPE 2 DIABETES MELLITUS WITHOUT COMPLICATION: ICD-10-CM

## 2023-03-07 ENCOUNTER — PATIENT MESSAGE (OUTPATIENT)
Dept: ADMINISTRATIVE | Facility: HOSPITAL | Age: 33
End: 2023-03-07

## 2023-08-27 ENCOUNTER — ON-DEMAND VIRTUAL (OUTPATIENT)
Dept: URGENT CARE | Facility: CLINIC | Age: 33
End: 2023-08-27
Payer: COMMERCIAL

## 2023-08-27 DIAGNOSIS — K52.9 GASTROENTERITIS: Primary | ICD-10-CM

## 2023-08-27 PROCEDURE — 99213 PR OFFICE/OUTPT VISIT, EST, LEVL III, 20-29 MIN: ICD-10-PCS | Mod: 95,,, | Performed by: NURSE PRACTITIONER

## 2023-08-27 PROCEDURE — 99213 OFFICE O/P EST LOW 20 MIN: CPT | Mod: 95,,, | Performed by: NURSE PRACTITIONER

## 2023-08-27 RX ORDER — ONDANSETRON 4 MG/1
4 TABLET, ORALLY DISINTEGRATING ORAL 2 TIMES DAILY
Qty: 14 TABLET | Refills: 0 | Status: SHIPPED | OUTPATIENT
Start: 2023-08-27 | End: 2023-12-02

## 2023-08-27 NOTE — PROGRESS NOTES
Subjective:      Patient ID: Nader Cardona is a 32 y.o. male.    Vitals:  vitals were not taken for this visit.     Chief Complaint: Diarrhea (2 days nausea and diarrhea. )      Visit Type: TELE AUDIOVISUAL    Present with the patient at the time of consultation: TELEMED PRESENT WITH PATIENT: family member    Past Medical History:   Diagnosis Date    Asthma     Diabetes mellitus, type 2      Past Surgical History:   Procedure Laterality Date    gynecomastia surgery      right knee surgery       Review of patient's allergies indicates:  No Known Allergies  Current Outpatient Medications on File Prior to Visit   Medication Sig Dispense Refill    blood-glucose meter kit Checks blood sugars 2x/daily. (Patient not taking: Reported on 2/24/2023) 1 each 0    glipiZIDE (GLUCOTROL) 5 MG tablet Take 1 tablet (5 mg total) by mouth 2 (two) times daily before meals. 60 tablet 0     No current facility-administered medications on file prior to visit.     Family History   Problem Relation Age of Onset    Cancer Mother         Breast Cancer    Cancer Father         Stage 4 lung cancer and throat cancer       Medications Ordered                [id number] - [place] - [address] AT [facility] [address] [address]    Telephone: [phone]   Fax: [phone]   Hours: Not open 24 hours                         E-Prescribed (1 of 1)              ondansetron (ZOFRAN-ODT) 4 MG TbDL    Sig: Take 1 tablet (4 mg total) by mouth 2 (two) times daily.       Start: 8/27/23     Quantity: 14 tablet Refills: 0                           Ohs Peq Odvv Intake    8/27/2023  9:18 AM CDT - Filed by Patient   Describe your reason for todays visit Nausea and diarrhea two days cant hold down food   What is your current physical address in the event of a medical emergency? [address]   Are you able to take your vital signs? No   Please attach any relevant images or files           Diarrhea   This is a new problem. The current episode started yesterday. The problem has been unchanged. The stool consistency is described as Watery. The patient states that diarrhea awakens him from sleep. Associated symptoms include abdominal pain, chills and vomiting. Pertinent negatives include no fever, headaches or sweats. Nothing aggravates the symptoms. There are no known risk factors. He has tried analgesics for the symptoms. The treatment provided no relief.       Constitution: Positive for chills. Negative for fever.   HENT: Negative.     Cardiovascular: Negative.    Eyes: Negative.    Respiratory: Negative.     Gastrointestinal:  Positive for abdominal pain, vomiting and diarrhea. Negative for bowel incontinence.   Endocrine: negative.   Genitourinary: Negative.  Negative for dysuria, flank pain, bladder incontinence and pelvic pain.   Musculoskeletal: Negative.  Negative for pain, abnormal ROM of joint and back pain.   Skin: Negative.    Allergic/Immunologic: Negative.    Neurological: Negative.  Negative for headaches.   Hematologic/Lymphatic: Negative.    Psychiatric/Behavioral: Negative.          Objective:   The physical exam was conducted virtually.  Physical Exam   Constitutional: He is oriented to person, place, and time. He appears well-developed.   HENT:   Head: Normocephalic and atraumatic.   Ears:   Right Ear: External ear normal.   Left Ear: External ear normal.   Nose: Nose normal.   Mouth/Throat: Mucous membranes are normal.   Eyes: Conjunctivae and lids are normal.   Neck: Trachea normal. Neck supple.   Cardiovascular: Normal rate, regular rhythm and normal heart sounds.   Pulmonary/Chest: Effort normal and breath sounds normal. No respiratory distress.   Abdominal: Normal appearance and bowel sounds are normal. He exhibits no distension and no mass. Soft. There is no abdominal tenderness.   Musculoskeletal: Normal range of motion.         General: Normal range of motion.    Neurological: He is alert and oriented to person, place, and time. He has normal strength.   Skin: Skin is warm, dry, intact, not diaphoretic and not pale.   Psychiatric: His speech is normal and behavior is normal. Judgment and thought content normal.   Nursing note and vitals reviewed.      Assessment:     1. Gastroenteritis        Plan:       Gastroenteritis  -     ondansetron (ZOFRAN-ODT) 4 MG TbDL; Take 1 tablet (4 mg total) by mouth 2 (two) times daily.  Dispense: 14 tablet; Refill: 0      Patient Instructions  Zofran 4 mg every 6 hours as needed for nausea  [place] diet once tolerated    It was a pleasure taking care of you today!    You must understand that you've received an Urgent Care treatment only and that you may be released before all your medical problems are known or treated. You, the patient, will arrange for follow up care as instructed. Please arrange follow up with your PCP or specialty clinic as directed in the next 1-2 weeks if not improved or as needed. If your condition worsens we recommend that you receive further evaluation at the emergency room immediately or contact your PCP to discuss your concerns.      Please go to the ER for severe pain, fever, difficulty breathing, high fever, altered mental status, or unable to hydrate by mouth, or acute changes to urinary/gastro-intestinal function, or severe or worsening of symptoms, acute neurological changes such as, numbness, weakness, tingling to the extremities, changes in vision.

## 2023-08-27 NOTE — PATIENT INSTRUCTIONS
Patient Instructions      Please follow up with your Primary care provider within 2-5 days if your signs and symptoms have not resolved or worsen.  The usual course of cold symptoms are 10-14 days.      If your condition worsens or fails to improve we recommend that you receive another evaluation at the emergency room immediately or contact your primary medical clinic to discuss your concerns.      You must understand that you have received an telemedicine treatment only and that you may be released before all of your medical problems are known or treated.   You, the patient, will arrange for follow up care as instructed.      Tylenol or Ibuprofen can also be used as directed for pain/fever unless you have an allergy to them or medical condition such as stomach ulcers, kidney or liver disease or blood thinners etc for which you should not be taking these type of medications.

## 2023-09-12 ENCOUNTER — OFFICE VISIT (OUTPATIENT)
Dept: INTERNAL MEDICINE | Facility: CLINIC | Age: 33
End: 2023-09-12
Payer: COMMERCIAL

## 2023-09-12 ENCOUNTER — LAB VISIT (OUTPATIENT)
Dept: LAB | Facility: HOSPITAL | Age: 33
End: 2023-09-12
Attending: FAMILY MEDICINE
Payer: COMMERCIAL

## 2023-09-12 VITALS
WEIGHT: 189.63 LBS | HEART RATE: 95 BPM | DIASTOLIC BLOOD PRESSURE: 74 MMHG | BODY MASS INDEX: 26.55 KG/M2 | TEMPERATURE: 98 F | OXYGEN SATURATION: 99 % | SYSTOLIC BLOOD PRESSURE: 118 MMHG | RESPIRATION RATE: 16 BRPM | HEIGHT: 71 IN

## 2023-09-12 DIAGNOSIS — E11.69 HYPERLIPIDEMIA ASSOCIATED WITH TYPE 2 DIABETES MELLITUS: ICD-10-CM

## 2023-09-12 DIAGNOSIS — E11.9 TYPE 2 DIABETES MELLITUS WITHOUT COMPLICATION, WITHOUT LONG-TERM CURRENT USE OF INSULIN: ICD-10-CM

## 2023-09-12 DIAGNOSIS — Z01.00 DIABETIC EYE EXAM: ICD-10-CM

## 2023-09-12 DIAGNOSIS — E78.5 HYPERLIPIDEMIA ASSOCIATED WITH TYPE 2 DIABETES MELLITUS: ICD-10-CM

## 2023-09-12 DIAGNOSIS — E55.9 VITAMIN D INSUFFICIENCY: ICD-10-CM

## 2023-09-12 DIAGNOSIS — Z00.01 ENCOUNTER FOR GENERAL ADULT MEDICAL EXAMINATION WITH ABNORMAL FINDINGS: ICD-10-CM

## 2023-09-12 DIAGNOSIS — E11.9 DIABETIC EYE EXAM: ICD-10-CM

## 2023-09-12 DIAGNOSIS — Z00.01 ENCOUNTER FOR GENERAL ADULT MEDICAL EXAMINATION WITH ABNORMAL FINDINGS: Primary | ICD-10-CM

## 2023-09-12 LAB
ALBUMIN/CREAT UR: NORMAL UG/MG (ref 0–30)
BACTERIA #/AREA URNS AUTO: NORMAL /HPF
BILIRUB UR QL STRIP: NEGATIVE
CLARITY UR REFRACT.AUTO: CLEAR
COLOR UR AUTO: ABNORMAL
CREAT UR-MCNC: 48 MG/DL (ref 23–375)
GLUCOSE UR QL STRIP: ABNORMAL
HGB UR QL STRIP: NEGATIVE
KETONES UR QL STRIP: ABNORMAL
LEUKOCYTE ESTERASE UR QL STRIP: NEGATIVE
MICROALBUMIN UR DL<=1MG/L-MCNC: <5 UG/ML
MICROSCOPIC COMMENT: NORMAL
NITRITE UR QL STRIP: NEGATIVE
PH UR STRIP: 7 [PH] (ref 5–8)
PROT UR QL STRIP: NEGATIVE
RBC #/AREA URNS AUTO: 0 /HPF (ref 0–4)
SP GR UR STRIP: >=1.03 (ref 1–1.03)
SQUAMOUS #/AREA URNS AUTO: 1 /HPF
URN SPEC COLLECT METH UR: ABNORMAL
WBC #/AREA URNS AUTO: 1 /HPF (ref 0–5)
YEAST UR QL AUTO: NORMAL

## 2023-09-12 PROCEDURE — 99999 PR PBB SHADOW E&M-EST. PATIENT-LVL V: ICD-10-PCS | Mod: PBBFAC,,, | Performed by: FAMILY MEDICINE

## 2023-09-12 PROCEDURE — 81001 URINALYSIS AUTO W/SCOPE: CPT | Performed by: FAMILY MEDICINE

## 2023-09-12 PROCEDURE — 99395 PR PREVENTIVE VISIT,EST,18-39: ICD-10-PCS | Mod: S$GLB,,, | Performed by: FAMILY MEDICINE

## 2023-09-12 PROCEDURE — 3074F SYST BP LT 130 MM HG: CPT | Mod: CPTII,S$GLB,, | Performed by: FAMILY MEDICINE

## 2023-09-12 PROCEDURE — 3078F PR MOST RECENT DIASTOLIC BLOOD PRESSURE < 80 MM HG: ICD-10-PCS | Mod: CPTII,S$GLB,, | Performed by: FAMILY MEDICINE

## 2023-09-12 PROCEDURE — 3008F BODY MASS INDEX DOCD: CPT | Mod: CPTII,S$GLB,, | Performed by: FAMILY MEDICINE

## 2023-09-12 PROCEDURE — 3078F DIAST BP <80 MM HG: CPT | Mod: CPTII,S$GLB,, | Performed by: FAMILY MEDICINE

## 2023-09-12 PROCEDURE — 3074F PR MOST RECENT SYSTOLIC BLOOD PRESSURE < 130 MM HG: ICD-10-PCS | Mod: CPTII,S$GLB,, | Performed by: FAMILY MEDICINE

## 2023-09-12 PROCEDURE — 99999 PR PBB SHADOW E&M-EST. PATIENT-LVL V: CPT | Mod: PBBFAC,,, | Performed by: FAMILY MEDICINE

## 2023-09-12 PROCEDURE — 3008F PR BODY MASS INDEX (BMI) DOCUMENTED: ICD-10-PCS | Mod: CPTII,S$GLB,, | Performed by: FAMILY MEDICINE

## 2023-09-12 PROCEDURE — 1160F RVW MEDS BY RX/DR IN RCRD: CPT | Mod: CPTII,S$GLB,, | Performed by: FAMILY MEDICINE

## 2023-09-12 PROCEDURE — 1160F PR REVIEW ALL MEDS BY PRESCRIBER/CLIN PHARMACIST DOCUMENTED: ICD-10-PCS | Mod: CPTII,S$GLB,, | Performed by: FAMILY MEDICINE

## 2023-09-12 PROCEDURE — 99395 PREV VISIT EST AGE 18-39: CPT | Mod: S$GLB,,, | Performed by: FAMILY MEDICINE

## 2023-09-12 PROCEDURE — 1159F PR MEDICATION LIST DOCUMENTED IN MEDICAL RECORD: ICD-10-PCS | Mod: CPTII,S$GLB,, | Performed by: FAMILY MEDICINE

## 2023-09-12 PROCEDURE — 82043 UR ALBUMIN QUANTITATIVE: CPT | Performed by: FAMILY MEDICINE

## 2023-09-12 PROCEDURE — 1159F MED LIST DOCD IN RCRD: CPT | Mod: CPTII,S$GLB,, | Performed by: FAMILY MEDICINE

## 2023-09-12 NOTE — PROGRESS NOTES
Subjective     Patient ID: Nader Cardona is a 33 y.o. male.    Chief Complaint: Annual Exam, Diabetes, and Cough (Dry Cough, 2x weeks)  33-year-old white female presents to clinic today for annual physical exam.  He has not been seen since 2021 secondary to a loss of insurance.  He was previously treated for type 2 diabetes and last known hemoglobin A1c was 9.8.  He has not been on any medication since his last visit.  He was seen in February by family medicine physician who recommended that the patient start glipizide but he reports that he has not taken any medication.  He reports frequent episodes of diarrhea and also complained of diarrhea secondary to metformin which is why glipizide was prescribed.  He reports a past surgical history of right knee surgery and gynecomastia surgery.  He reports a family history of his father passing away from lung cancer in his mother having breast cancer.  Diabetes  He has type 2 diabetes mellitus. No MedicAlert identification noted. The initial diagnosis of diabetes was made 4 years ago. Hypoglycemia symptoms include nervousness/anxiousness. Pertinent negatives for hypoglycemia include no confusion, dizziness, headaches, hunger, mood changes, pallor, seizures, sleepiness, speech difficulty, sweats or tremors. Associated symptoms include polydipsia, polyuria and weight loss. Pertinent negatives for diabetes include no blurred vision, no chest pain, no fatigue, no foot paresthesias, no foot ulcerations, no polyphagia, no visual change and no weakness. Pertinent negatives for hypoglycemia complications include no blackouts, no hospitalization, no nocturnal hypoglycemia, no required assistance and no required glucagon injection. Symptoms are stable. Diabetic complications include impotence. Pertinent negatives for diabetic complications include no autonomic neuropathy, CVA, heart disease, nephropathy, peripheral neuropathy, PVD or retinopathy. Risk factors for coronary artery  disease include sedentary lifestyle, stress, diabetes mellitus and male sex. When asked about current treatments, none were reported. He is compliant with treatment none of the time. His weight is stable. He is following a diabetic diet. When asked about meal planning, he reported none. He has not had a previous visit with a dietitian. He never participates in exercise. There is no compliance with monitoring of blood glucose. He does not see a podiatrist.Eye exam is current.     Review of Systems   Constitutional:  Positive for weight loss. Negative for appetite change, chills, fatigue and fever.   HENT:  Negative for nasal congestion, ear pain, hearing loss, postnasal drip, rhinorrhea, sinus pressure/congestion, sore throat and tinnitus.    Eyes:  Negative for blurred vision, redness, itching and visual disturbance.   Respiratory:  Positive for cough. Negative for chest tightness and shortness of breath.    Cardiovascular:  Negative for chest pain and palpitations.   Gastrointestinal:  Positive for diarrhea. Negative for abdominal pain, constipation, nausea and vomiting.   Endocrine: Positive for polydipsia and polyuria. Negative for polyphagia.   Genitourinary:  Positive for impotence. Negative for decreased urine volume, difficulty urinating, dysuria, frequency, hematuria and urgency.   Musculoskeletal:  Negative for back pain, myalgias, neck pain and neck stiffness.   Integumentary:  Negative for pallor and rash.   Neurological:  Negative for dizziness, tremors, seizures, speech difficulty, weakness, light-headedness and headaches.   Psychiatric/Behavioral:  Negative for confusion. The patient is nervous/anxious.           Objective     Physical Exam  Vitals and nursing note reviewed.   Constitutional:       General: He is not in acute distress.     Appearance: Normal appearance. He is well-developed. He is not diaphoretic.   HENT:      Head: Normocephalic and atraumatic.      Right Ear: External ear normal.       Left Ear: External ear normal.      Nose: Nose normal.      Mouth/Throat:      Pharynx: No oropharyngeal exudate.   Eyes:      General: No scleral icterus.        Right eye: No discharge.         Left eye: No discharge.      Conjunctiva/sclera: Conjunctivae normal.      Pupils: Pupils are equal, round, and reactive to light.   Neck:      Thyroid: No thyromegaly.      Vascular: No JVD.      Trachea: No tracheal deviation.   Cardiovascular:      Rate and Rhythm: Normal rate and regular rhythm.      Heart sounds: Normal heart sounds. No murmur heard.     No friction rub. No gallop.   Pulmonary:      Effort: Pulmonary effort is normal. No respiratory distress.      Breath sounds: Normal breath sounds. No stridor. No wheezing, rhonchi or rales.   Chest:      Chest wall: No tenderness.   Abdominal:      General: Bowel sounds are normal. There is no distension.      Palpations: Abdomen is soft. There is no mass.      Tenderness: There is no abdominal tenderness. There is no guarding or rebound.   Musculoskeletal:         General: No tenderness. Normal range of motion.      Cervical back: Normal range of motion and neck supple.   Lymphadenopathy:      Cervical: No cervical adenopathy.   Skin:     General: Skin is warm and dry.      Coloration: Skin is not pale.      Findings: No erythema or rash.   Neurological:      Mental Status: He is alert and oriented to person, place, and time.   Psychiatric:         Mood and Affect: Mood normal.         Behavior: Behavior normal.         Thought Content: Thought content normal.         Judgment: Judgment normal.            Assessment and Plan     1. Encounter for general adult medical examination with abnormal findings  -     CBC Auto Differential; Future; Expected date: 09/12/2023  -     Comprehensive Metabolic Panel; Future; Expected date: 09/12/2023  -     Lipid Panel; Future; Expected date: 09/12/2023  -     T4, Free; Future; Expected date: 09/12/2023  -     TSH; Future;  Expected date: 09/12/2023  -     Urinalysis; Future; Expected date: 09/12/2023  -     Vitamin D; Future; Expected date: 09/12/2023  -     Hemoglobin A1C; Future; Expected date: 09/12/2023  -     Microalbumin/Creatinine Ratio, Urine; Future; Expected date: 09/12/2023    2. Type 2 diabetes mellitus without complication, without long-term current use of insulin  -     Comprehensive Metabolic Panel; Future; Expected date: 09/12/2023  -     Urinalysis; Future; Expected date: 09/12/2023  -     Hemoglobin A1C; Future; Expected date: 09/12/2023  -     Microalbumin/Creatinine Ratio, Urine; Future; Expected date: 09/12/2023  -     Ambulatory referral/consult to Internal Medicine; Future; Expected date: 09/19/2023    3. Hyperlipidemia associated with type 2 diabetes mellitus  -     Lipid Panel; Future; Expected date: 09/12/2023    4. Vitamin D insufficiency  -     Vitamin D; Future; Expected date: 09/12/2023    5. Diabetic eye exam  -     Ambulatory referral/consult to Optometry; Future; Expected date: 09/19/2023        1. CBC, CMP, UA, TSH, free T4, fasting lipids, vitamin-D level, hemoglobin A1c, and urine microalbumin to creatinine ratio.    2. Refer to Batsheva Muñoz NP to restart treatment for type 2 diabetes.  3. Encourage diet and exercise.    4. Recommend starting over-the-counter vitamin-D 2000 units daily for treatment of vitamin-D insufficiency.  5. Refer to Optometry for diabetic eye exam.  6. Return to clinic as needed or in 3 months for diabetic follow-up.               Follow up in about 3 months (around 12/12/2023), or if symptoms worsen or fail to improve, for Diabetes.

## 2023-09-13 ENCOUNTER — PATIENT MESSAGE (OUTPATIENT)
Dept: INTERNAL MEDICINE | Facility: CLINIC | Age: 33
End: 2023-09-13
Payer: COMMERCIAL

## 2023-09-13 DIAGNOSIS — E78.5 HYPERLIPIDEMIA ASSOCIATED WITH TYPE 2 DIABETES MELLITUS: ICD-10-CM

## 2023-09-13 DIAGNOSIS — E11.69 HYPERLIPIDEMIA ASSOCIATED WITH TYPE 2 DIABETES MELLITUS: ICD-10-CM

## 2023-09-13 DIAGNOSIS — E11.9 TYPE 2 DIABETES MELLITUS WITHOUT COMPLICATION, WITHOUT LONG-TERM CURRENT USE OF INSULIN: Primary | ICD-10-CM

## 2023-09-13 RX ORDER — ATORVASTATIN CALCIUM 10 MG/1
10 TABLET, FILM COATED ORAL DAILY
Qty: 90 TABLET | Refills: 3 | Status: SHIPPED | OUTPATIENT
Start: 2023-09-13 | End: 2024-09-12

## 2023-09-13 NOTE — TELEPHONE ENCOUNTER
"Contacted pt to inform that  states ,    " labs show an elevated hemoglobin A1c of 10.5, a high triglyceride level, and low vitamin-D level.  I have prescribed Ozempic for treatment of your diabetes.  I recommend starting treatment with 0.25 mg weekly for 4 weeks then increasing your dose to 0.5 mg weekly.  I have prescribed Lipitor for treatment of your high cholesterol levels and also recommend that you start taking over-the-counter Co Q10 along with the Lipitor for further treatment.  I recommend that you start taking over-the-counter vitamin-D 2000 units daily for treatment of your low vitamin-D level.  I will repeat your metabolic panel, A1c, and fasting lipid panel in 3 months."    Pt vu , prior auth has been started for ozempic .  Labs and 3m f/u scheduled in December .  "

## 2023-09-18 ENCOUNTER — TELEPHONE (OUTPATIENT)
Dept: INTERNAL MEDICINE | Facility: CLINIC | Age: 33
End: 2023-09-18
Payer: COMMERCIAL

## 2023-09-18 NOTE — TELEPHONE ENCOUNTER
----- Message from Yaritza Xie sent at 9/18/2023  3:43 PM CDT -----  Contact: [phone]  Patient is returning a phone call.  Who left a message for the patient: Ria Cordero MA  Does patient know what this is regarding:  medication/ semaglutide (OZEMPIC) 0.25 mg or 0.5 mg (2 mg/3 mL) pen injector  Would you like a call back, or a response through your [facility] portal?:   call back  Comments:

## 2023-09-18 NOTE — TELEPHONE ENCOUNTER
Poke to Pt\. Regarding Ozempic prescription having trouble getting prescription. I called his pharmacy to see what's the problem. The Pharmacist stated she doesn't she why he shouldn't get prescription but wanted me to let the Pt. Know that she will be reaching out to him. Called pt. Back and stated that pharmacy will be giving him a call regarding prescription. Judah Wong

## 2023-09-26 ENCOUNTER — OFFICE VISIT (OUTPATIENT)
Dept: INTERNAL MEDICINE | Facility: CLINIC | Age: 33
End: 2023-09-26
Payer: COMMERCIAL

## 2023-09-26 VITALS
HEART RATE: 99 BPM | RESPIRATION RATE: 16 BRPM | BODY MASS INDEX: 27.22 KG/M2 | OXYGEN SATURATION: 100 % | WEIGHT: 194.44 LBS | DIASTOLIC BLOOD PRESSURE: 80 MMHG | HEIGHT: 71 IN | TEMPERATURE: 98 F | SYSTOLIC BLOOD PRESSURE: 140 MMHG

## 2023-09-26 DIAGNOSIS — E78.5 HYPERLIPIDEMIA ASSOCIATED WITH TYPE 2 DIABETES MELLITUS: ICD-10-CM

## 2023-09-26 DIAGNOSIS — E11.9 TYPE 2 DIABETES MELLITUS WITHOUT COMPLICATION, WITHOUT LONG-TERM CURRENT USE OF INSULIN: Primary | ICD-10-CM

## 2023-09-26 DIAGNOSIS — E11.69 HYPERLIPIDEMIA ASSOCIATED WITH TYPE 2 DIABETES MELLITUS: ICD-10-CM

## 2023-09-26 LAB
BACTERIA #/AREA URNS AUTO: NORMAL /HPF
BILIRUB SERPL-MCNC: ABNORMAL MG/DL
BILIRUB UR QL STRIP: NEGATIVE
BLOOD URINE, POC: ABNORMAL
CLARITY UR REFRACT.AUTO: CLEAR
CLARITY, POC UA: CLEAR
COLOR UR AUTO: YELLOW
COLOR, POC UA: ABNORMAL
GLUCOSE SERPL-MCNC: 323 MG/DL (ref 70–110)
GLUCOSE UR QL STRIP: 1000
GLUCOSE UR QL STRIP: ABNORMAL
HGB UR QL STRIP: NEGATIVE
KETONES UR QL STRIP: ABNORMAL
KETONES UR QL STRIP: ABNORMAL
LEUKOCYTE ESTERASE UR QL STRIP: NEGATIVE
LEUKOCYTE ESTERASE URINE, POC: ABNORMAL
MICROSCOPIC COMMENT: NORMAL
NITRITE UR QL STRIP: NEGATIVE
NITRITE, POC UA: ABNORMAL
PH UR STRIP: 7 [PH] (ref 5–8)
PH, POC UA: 7
PROT UR QL STRIP: NEGATIVE
PROTEIN, POC: ABNORMAL
SP GR UR STRIP: >1.03 (ref 1–1.03)
SPECIFIC GRAVITY, POC UA: 1
URN SPEC COLLECT METH UR: ABNORMAL
UROBILINOGEN, POC UA: 1
YEAST UR QL AUTO: NORMAL

## 2023-09-26 PROCEDURE — 3077F PR MOST RECENT SYSTOLIC BLOOD PRESSURE >= 140 MM HG: ICD-10-PCS | Mod: CPTII,S$GLB,, | Performed by: NURSE PRACTITIONER

## 2023-09-26 PROCEDURE — 3046F PR MOST RECENT HEMOGLOBIN A1C LEVEL > 9.0%: ICD-10-PCS | Mod: CPTII,S$GLB,, | Performed by: NURSE PRACTITIONER

## 2023-09-26 PROCEDURE — 99214 PR OFFICE/OUTPT VISIT, EST, LEVL IV, 30-39 MIN: ICD-10-PCS | Mod: S$GLB,,, | Performed by: NURSE PRACTITIONER

## 2023-09-26 PROCEDURE — 3061F NEG MICROALBUMINURIA REV: CPT | Mod: CPTII,S$GLB,, | Performed by: NURSE PRACTITIONER

## 2023-09-26 PROCEDURE — 1159F MED LIST DOCD IN RCRD: CPT | Mod: CPTII,S$GLB,, | Performed by: NURSE PRACTITIONER

## 2023-09-26 PROCEDURE — 82962 GLUCOSE BLOOD TEST: CPT | Mod: S$GLB,,, | Performed by: NURSE PRACTITIONER

## 2023-09-26 PROCEDURE — 3077F SYST BP >= 140 MM HG: CPT | Mod: CPTII,S$GLB,, | Performed by: NURSE PRACTITIONER

## 2023-09-26 PROCEDURE — 1160F RVW MEDS BY RX/DR IN RCRD: CPT | Mod: CPTII,S$GLB,, | Performed by: NURSE PRACTITIONER

## 2023-09-26 PROCEDURE — 3066F PR DOCUMENTATION OF TREATMENT FOR NEPHROPATHY: ICD-10-PCS | Mod: CPTII,S$GLB,, | Performed by: NURSE PRACTITIONER

## 2023-09-26 PROCEDURE — 81001 URINALYSIS AUTO W/SCOPE: CPT | Performed by: NURSE PRACTITIONER

## 2023-09-26 PROCEDURE — 1159F PR MEDICATION LIST DOCUMENTED IN MEDICAL RECORD: ICD-10-PCS | Mod: CPTII,S$GLB,, | Performed by: NURSE PRACTITIONER

## 2023-09-26 PROCEDURE — 3008F BODY MASS INDEX DOCD: CPT | Mod: CPTII,S$GLB,, | Performed by: NURSE PRACTITIONER

## 2023-09-26 PROCEDURE — 99214 OFFICE O/P EST MOD 30 MIN: CPT | Mod: S$GLB,,, | Performed by: NURSE PRACTITIONER

## 2023-09-26 PROCEDURE — 3008F PR BODY MASS INDEX (BMI) DOCUMENTED: ICD-10-PCS | Mod: CPTII,S$GLB,, | Performed by: NURSE PRACTITIONER

## 2023-09-26 PROCEDURE — 99999 PR PBB SHADOW E&M-EST. PATIENT-LVL V: ICD-10-PCS | Mod: PBBFAC,,, | Performed by: NURSE PRACTITIONER

## 2023-09-26 PROCEDURE — 82962 POCT GLUCOSE, HAND-HELD DEVICE: ICD-10-PCS | Mod: S$GLB,,, | Performed by: NURSE PRACTITIONER

## 2023-09-26 PROCEDURE — 3046F HEMOGLOBIN A1C LEVEL >9.0%: CPT | Mod: CPTII,S$GLB,, | Performed by: NURSE PRACTITIONER

## 2023-09-26 PROCEDURE — 3079F DIAST BP 80-89 MM HG: CPT | Mod: CPTII,S$GLB,, | Performed by: NURSE PRACTITIONER

## 2023-09-26 PROCEDURE — 1160F PR REVIEW ALL MEDS BY PRESCRIBER/CLIN PHARMACIST DOCUMENTED: ICD-10-PCS | Mod: CPTII,S$GLB,, | Performed by: NURSE PRACTITIONER

## 2023-09-26 PROCEDURE — 99999 PR PBB SHADOW E&M-EST. PATIENT-LVL V: CPT | Mod: PBBFAC,,, | Performed by: NURSE PRACTITIONER

## 2023-09-26 PROCEDURE — 3061F PR NEG MICROALBUMINURIA RESULT DOCUMENTED/REVIEW: ICD-10-PCS | Mod: CPTII,S$GLB,, | Performed by: NURSE PRACTITIONER

## 2023-09-26 PROCEDURE — 81002 URINALYSIS NONAUTO W/O SCOPE: CPT | Mod: S$GLB,,, | Performed by: NURSE PRACTITIONER

## 2023-09-26 PROCEDURE — 3066F NEPHROPATHY DOC TX: CPT | Mod: CPTII,S$GLB,, | Performed by: NURSE PRACTITIONER

## 2023-09-26 PROCEDURE — 3079F PR MOST RECENT DIASTOLIC BLOOD PRESSURE 80-89 MM HG: ICD-10-PCS | Mod: CPTII,S$GLB,, | Performed by: NURSE PRACTITIONER

## 2023-09-26 PROCEDURE — 81002 POCT URINE DIPSTICK WITHOUT MICROSCOPE: ICD-10-PCS | Mod: S$GLB,,, | Performed by: NURSE PRACTITIONER

## 2023-09-26 RX ORDER — LANCETS
1 EACH MISCELLANEOUS
Qty: 200 EACH | Refills: 3 | Status: SHIPPED | OUTPATIENT
Start: 2023-09-26

## 2023-09-26 RX ORDER — INSULIN DETEMIR 100 [IU]/ML
15 INJECTION, SOLUTION SUBCUTANEOUS DAILY
Qty: 15 ML | Refills: 3 | Status: SHIPPED | OUTPATIENT
Start: 2023-09-26 | End: 2023-11-08

## 2023-09-26 RX ORDER — INSULIN PUMP SYRINGE, 3 ML
EACH MISCELLANEOUS
Qty: 1 EACH | Refills: 0 | Status: SHIPPED | OUTPATIENT
Start: 2023-09-26 | End: 2024-09-25

## 2023-09-26 RX ORDER — PEN NEEDLE, DIABETIC 30 GX3/16"
1 NEEDLE, DISPOSABLE MISCELLANEOUS DAILY
Qty: 90 EACH | Refills: 1 | Status: SHIPPED | OUTPATIENT
Start: 2023-09-26 | End: 2023-11-08 | Stop reason: SDUPTHER

## 2023-09-26 NOTE — PROGRESS NOTES
33 y.o. male here for 2 year follow up for management of T2dm -   Last seen [facility] 2021 - those notes below.     Hgba1c is still elevated, from 9.8% ---> 10.5%   Recently stopped drinking alcohol - was drinking socially on weekends - 15 beers over 2 days time.   Is trying to make some diet changes - cutting out chips -   Is feeling bad -- having diarrhea and ED, drinking a lot of water, feels thirsty.   Urination more.     He had been taking Xigduo XR 5/1000 mg tablet daily in morning - it seemed to   Not checking blood sugars   Does not have a glucometer.   Diet- fast food, processed foods, and changed diet a couple weeks when he saw his recent labs.   Eating lean meats, veggies.   Some fried food, and potatoes.   Reports was closer to 250 lbs and is now around 194 lbs.     Labs done to r/o type 1 in 2021 were negative, has not had repeat work up.   Glucose today - 3 hours post prandial - fasting at level of 323.   Had a burrito for lunch with chips.   Urine dip done -   Clean voided - yellow -   Clear -   1005   7   Negative -   Ph 7 -   Leukocytes +  Nitrites -   Protein +   1000+   Moderate amount of ketones  Urobili+  Negative for bilirubin   Negative blood             Last visit notes as follows from 8/5/2021 -   30 y.o. male, here for follow up visit for management of type 2 diabetes.   Today is his 6 week follow up -     Was initially on metformin and then I switched him to xigduo 5/1000mg   He is not taking daily - admits has only taken a total of 7 to 10 tablets possibly.   cpeptide was detected on labwork - so treating as T2dm.    Met with PAT Early for DE on 7/16/2021 -   Today, he ate breakfast - a bag of cookies out of vending machine at 4 am (he has to get to work early).   His sugar today - 5 hours later is 220. We checked here in the office.   While his sugars are decreasing, he is not near goal.   He was checking his sugars almost daily in the morning time, however his lancet device broke -  "and he needs a new one.   Showed me his logs below:   States when he is "in the green" - he is on the xigduo -   The red glucose levels below are when he is not on the medication -   He is remaining active, he has a 7 month old son - so is busy many weekends taking care of him.             Last visit notes from June 2021 as follows:   HPI: Nader Cardona is a 33 y.o.  male c/I for visit to address Diabetes Type 2  This is the first time I am seeing them for care, follows with Dr. Kelley, Ab BAAC MD for primary care needs.   Has not seen endocrinology or diabetes specialist in the past.     was diagnosed with T2DM about 2 years ago -   Paternal aunt has T1 diabetes only.  Grandfather also has Diabetes - unknown what type.   Has never been hospitalized r/t DM.    a1c is elevated @ 9.8%.   He just began on metformin 500mg tablets - 2 per day. Having diarrhea - right after taking it.   But not an issue while at work.   He's been on metformin for 1 month now.   Admits eats lots of fast food/lives by himself. Lots of high carb snacks.   Smokes cigar on occasion -   Drinks ETOH socially on weekend - beer mostly, vodka/soda water.   Doesn't check blood sugars - knows how.   His fasting bg today in clinic is 255   Feeling down as his dad just passed away last month of lung cancer, also has new 6 month old son/baby.   He denies depression however. We discussed medication for now or referral to counselor, but he politely declined during the visit.     Complications from diabetes include:   Negative for DKA.   Has never taken insulin in life.   -negative for diabetic neuropathy.   -negative for nephropathy.   No microalbuminuria test done.   Eyes - negative for Diabetic retinopathy - cyst in right eye - outside facility in [place] - eye care.   Denies CV disease.   Denies known CAD.   HTN - none  HLD - controlled - just begun on statin.     Past medical History:   Past Medical History:   Diagnosis Date    Asthma     " "Diabetes mellitus, type 2       Family hx:   Family History   Problem Relation Age of Onset    Cancer Mother         Breast Cancer    Cancer Father         Stage 4 lung cancer and throat cancer      Current meds:   Current Outpatient Medications:     atorvastatin (LIPITOR) 10 MG tablet, Take 1 tablet (10 mg total) by mouth once daily., Disp: 90 tablet, Rfl: 3    blood sugar diagnostic (ONETOUCH VERIO TEST STRIPS) Strp, 1 each by Misc.(Non-Drug; Combo Route) route 2 (two) times daily before meals., Disp: 200 each, Rfl: 1    blood-glucose meter kit, Checks blood sugars 2x/daily. (Patient not taking: Reported on 2/24/2023), Disp: 1 each, Rfl: 0    blood-glucose meter kit, Use as instructed, Disp: 1 each, Rfl: 0    insulin detemir U-100, Levemir, (LEVEMIR FLEXPEN) 100 unit/mL (3 mL) InPn pen, Inject 15 Units into the skin once daily., Disp: 15 mL, Rfl: 3    lancets (LANCETS,THIN) Misc, 1 each by Misc.(Non-Drug; Combo Route) route 2 (two) times daily before meals., Disp: 200 each, Rfl: 3    ondansetron (ZOFRAN-ODT) 4 MG TbDL, Take 1 tablet (4 mg total) by mouth 2 (two) times daily., Disp: 14 tablet, Rfl: 0    pen needle, diabetic 32 gauge x 5/32" Ndle, Inject 1 each into the skin once daily., Disp: 90 each, Rfl: 1    semaglutide (OZEMPIC) 0.25 mg or 0.5 mg (2 mg/3 mL) pen injector, Inject 0.5 mg into the skin every 7 days., Disp: 1 each, Rfl: 3     Current Diabetes medications:   xigduo XR 5/1000mg tablet daily in morning.     Medications Tried and Failed:   Metformin 500 mg - 2 tablets per day.     Review of Pertinent co-morbidities/risk factors:   CV: Denies history of MI nor stroke.   CAD: Denies.  Takes aspirin 81mg tablet daily  BP: has history of HTN  Statin: Taking  ACE/ARB: Not taking    Social History     Tobacco Use   Smoking Status Never   Smokeless Tobacco Never      Social:   Lives at home by himself. Has a 6 month old son - is not in relationship with the mother  Life changes/stressors currently: Father " " last month of Stage 4 lung cancer - so very stressed about that.   Also has 6 month old son - he just found out son is his about 4 months ago. He is in middle of changing jobs also.   Diet: not following ADA diet  Meals: 2 - 3 per day and snacks       Breakfast - none.        Lunch - fried chicken or hamburger/fries/diet coke or coke zero - used to drink sprite       Dinner - same - fast food - tacos, fried chicken, burgers, chinese food.        Snacks - cheeze its, pringles, little lula chocolate swiss cake rolls. Cookies        Goes to [employer] machine to get food often because little time.         Drinks- sprite, diet coke   Exercise: none.   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, loading fork lifts     Glucose Monitoring:   Is not checking blood sugars -     Standards of care:   Eyes: .: 2021  Foot exam: : 2021   Diabetes education: yes 2021 -    Vital Signs  BP (!) 140/80 (BP Location: Right arm, Patient Position: Sitting, BP Method: Medium (Manual))   Pulse 99   Temp 98.1 °F (36.7 °C) (Temporal)   Resp 16   Ht 5' 11" (1.803 m)   Wt 88.2 kg (194 lb 7.1 oz)   SpO2 100%   BMI 27.12 kg/m²     Pertinent Labs:   Hgba1c   Lab Results   Component Value Date    HGBA1C 10.5 (H) 2023    HGBA1C 9.8 (H) 2021     Lipid panel   Lab Results   Component Value Date    CHOL 141 2023    CHOL 157 2021     Lab Results   Component Value Date    HDL 34 (L) 2023    HDL 45 2021     Lab Results   Component Value Date    LDLCALC 56.2 (L) 2023    LDLCALC 70.0 2021     Lab Results   Component Value Date    TRIG 254 (H) 2023    TRIG 210 (H) 2021     Lab Results   Component Value Date    CHOLHDL 24.1 2023    CHOLHDL 28.7 2021      CMP  Glucose   Date Value Ref Range Status   2023 322 (H) 70 - 110 mg/dL Final     BUN   Date Value Ref Range Status   2023 11 6 - 20 mg/dL Final     Creatinine   Date Value Ref Range Status "   09/12/2023 0.8 0.5 - 1.4 mg/dL Final     eGFR if    Date Value Ref Range Status   06/25/2021 >60.0 >60 mL/min/1.73 m^2 Final     eGFR if non    Date Value Ref Range Status   06/25/2021 >60.0 >60 mL/min/1.73 m^2 Final     Comment:     Calculation used to obtain the estimated glomerular filtration  rate (eGFR) is the CKD-EPI equation.        AST   Date Value Ref Range Status   09/12/2023 18 10 - 40 U/L Final     ALT   Date Value Ref Range Status   09/12/2023 27 10 - 44 U/L Final     Microalbumin creatinine ratio:   Lab Results   Component Value Date    MICALBCREAT Unable to calculate 09/12/2023       Review Of Systems:   Gen: Appetite good, no weight gain or loss, denies fatigue and weakness. + polydipsia.  Skin: Skin is intact and heals well, denies any rashes or hair changes.   EENT: Denies any acute visual disturbances, nor blurred vision.   Resp: Denies SOB or Dyspnea on exertion, denies cough.   Cardiac: Denies chest pain, palpitations, or swelling.   GI: Denies abdominal pain, nausea or vomiting, diarrhea, or constipation.   /GYN: Denies nocturia, nor burning, frequency or pain on urination. + ED  MS/Neuro: Denies numbness/ tingling in BLE; Gait steady, speech clear  Psych: Denies drug/ETOH abuse, no hx of depression.  Other systems: negative.    Physical Exam:   GENERAL: Well developed, well nourished in appearance.   PSYCH: AAOx3, appropriate mood and affect, pleasant expression, conversant, appears relaxed, well groomed.   EYES: PERRL, Conjunctiva and corneas clear  NECK: Soft and Supple, trachea midline,  CHEST: Even, regular, and unlabored respirations  ABDOMEN: Soft, non-tender, non-distended.   VASCULAR: pedal pulses palpable bilaterally, no edema.  NEURO:  cranial nerves II - XII intact   MUSCULOSKELETAL: Good ROM, steady gait.   SKIN: Skin warm, dry, and intact -     Assessment and Plan of Care:     Nader was seen today for follow-up.    Diagnoses and all orders for  "this visit:    Type 2 diabetes mellitus without complication, without long-term current use of insulin  -     Ambulatory referral/consult to Internal Medicine  -     POCT Glucose, Hand-Held Device  -     semaglutide (OZEMPIC) 0.25 mg or 0.5 mg (2 mg/3 mL) pen injector; Inject 0.5 mg into the skin every 7 days.  -     blood-glucose meter kit; Use as instructed  -     blood sugar diagnostic (ONETOUCH VERIO TEST STRIPS) Strp; 1 each by Misc.(Non-Drug; Combo Route) route 2 (two) times daily before meals.  -     lancets (LANCETS,THIN) Misc; 1 each by Misc.(Non-Drug; Combo Route) route 2 (two) times daily before meals.  -     POCT urine dipstick without microscope  -     Anti-islet cell antibody; Future  -     Comprehensive Metabolic Panel; Future  -     C-Peptide; Future  -     Glutamic Acid Decarboxylase; Future  -     insulin detemir U-100, Levemir, (LEVEMIR FLEXPEN) 100 unit/mL (3 mL) InPn pen; Inject 15 Units into the skin once daily.  -     pen needle, diabetic 32 gauge x 5/32" Ndle; Inject 1 each into the skin once daily.  -     Urinalysis, Reflex to Urine Culture Urine, Clean Catch; Future  -     Urinalysis, Reflex to Urine Culture Urine, Clean Catch  -     Urinalysis, Reflex to Urine Culture Urine, Clean Catch; Future    Hyperlipidemia associated with type 2 diabetes mellitus       1. T2DM with hyperglycemia- Hgba1c goal is 7.5% or less without hypoglycemia - 9.8% --> 10.5% currently.   discussed DM, progression of disease, long term complications, CV risk factors and tx options.   Advise compliance with ADA diet and encourage exercise  cpeptide level is present but low @ 1.07- 2 years ago.   Recheck those labs.   Urine dip + for glucose and ketones - so I'm starting insulin for now.   DE consulted for teaching and return demonstration -   Levemir 15 units daily.    We discussed in full changing diet dramatically    Instructed to monitor Blood glucose 2x/day before meals and bring meter/ log to every clinic " visit. Sent in rx for meter, and supplies-     2. Blood Pressure- controlled, no meds at this time.   Slightly elevated in clinic today.     3. Lipids- LDL goal < 100.  At goal. Trigs high - likely diet related.   Currently was just begun on zocor. To continue.     4. Weight - BMI Body mass index is 27.12 kg/m².   Encourage Ada diet and exercise.   Maybe glp1 in future - starting insulin currently.     5. Renal Function - stable.   No history of nephropathy.   Will follow.   No urine mac, will obtain.     6. Vitamin D def - on replacement - defer to primarcy        Start insulin -   Dumping sugar and + ketones -   Diabetes educator follow up/consult.   Recheck in 4 - 6 weeks time.

## 2023-09-26 NOTE — PATIENT INSTRUCTIONS
"Start insulin for now - give 15 units once per day -   Inject at the same time daily -       Eat small frequent meals.   Check the glucose 2 times daily before meals. Write it down.   Normal glucose should range 80 - 120 before meals.     Call me if low blood sugars less than 80.   For low blood sugar - remember to keep glucose tablets on hand. You can purchase these at the pharmacy check out desk/over the counter.   If blood sugar is less than 70, take/eat 2 - 3 tablets quickly to bring your sugar up.   Always keep 15 grams of Quick acting carbohydrates on hand to eat/drink if your sugar is low - examples are 1/2 cup of juice, coke, or crackers, granola bars.   Remember to eat meals frequently to prevent low blood blood sugars.      Notify the office if any side effects from medicine, like nausea or abdominal pain or vomiting.     Low Carb Snacks & Diabetes friendly foods   Aim for 30 - 40 grams of carbs for 3 meals per day (or 2 meals and  1 - 2 snacks - however you prefer)  Snacks can be 15 - 20 grams of carbs.     Eating small, frequent meals will help you to feel more satisfied, and more full so that you don't over eat or eat the wrong foods later.   Also, rukhsana meals/snacks allow you to spread carbohydrates evenly, which may stabilize blood sugars.   Below you will find a list of ideas of foods that I like/prefer.   Feel free to give me your ideas and tips if you find good ones too!   Overall - please remember to limit refined sugars such as soft drinks, juices, rices, pastas, breads, cakes.   Look at the back of the label - look at the amount of "carbohydrates", then look at the amount of "fiber" - subtract the amount of fiber from the amount of carbs - this is your "net carb intake".  The more fiber a food has, the better generally, as you get to subtract this from the net carbs.     0-5 gm carb  Crystal Light flavoring (I like fruit punch flavor!)  Vitamin Water Zero  Komal antioxidant drinks.   Sparkling " "ice (long skinny flavored water bottles for $1 that are sugar free).   Tiesha water, WaterLoo - carbonated flavored palafox, various flavors.  Diet coke, diet barqs, sprite zero.  Diet sunkist (orange drink)  Sugar free powerade  Herbal tea, unsweetened  2 tsp peanut butter on celery or carrots  Waynelynda's original Candies - (the Sugar Free ones!)  1/2 cup sugar-free jell-o  1 sugar-free popsicle  ¼ cup blueberries or strawberries  8oz Blue Mounika unsweetened almond milk (or there is sugar free vanilla flavored as well).  5 baby carrots & celery sticks, cucumbers, bell peppers dipped in ¼ cup salsa, 2Tbsp light ranch dressing or 2Tbsp plain Greek yogurt  10 Goldfish crackers  ½ oz low-fat cheese or string cheese  1 closed handful of nuts, unsalted (example-->almonds, pistachios, cashews, peanuts, etc).  1 Tbsp of sunflower seeds, unsalted  1 cup Smart Pop popcorn  1 whole grain brown rice cake   "Think Thin" protein bars - my personal favorite is Creamy Peanut butter, chocolate brownie, or oreo flavors.  "Catherine" bars  - can be found at [employer] grocery store - they have 5 grams of net carbohydrates.  Quest bars (my favorite is birthday cake, and also cinnamon roll is good melted for 10 seconds in the microwave - please remove the wrapper first!)  Premier protein shakes - sold at [employer], or other brand alternatives - usually 1 - 2 grams of carbs (strawberry, vanilla, chocolate flavors) Coffee flavor is my new morning favorite!   Scrambled eggs! Or a fried egg or boiled eggs - add sliced tomatoes, cilantro or some chopped green onions.   Eggs are good with any and all veggies! You can even eat them for dinner or in a low carb tortilla, or served with your favorite grilled meat/sausage or bowen is my favorite.   Check out pre-made egg scrambles in the egg grocery store section - Ore Vy "just crack an egg" is premixed cheese, bowen and small amount of pototes, small cup size portions for your breakfast - very " "convenient!   Tray basilio - zucchini - can buy in the frozen foods section. Birds eye brand is usually cheap. Tip - saute with oil/onions or italian seasoning and use this as a pasta substitution.  Milk - is usually high in carbs/sugar - use O-RID milk brand instead - it is "filtered" milk - with half the amount of carbs of regular milk. (next to the milk in milk aisle).   Smuckers sugar free Breakfast syrup (or 1 tablespoon of low sugar breakfast syrup) instead of regular syrup.        15 gm carb  1 small piece of fruit or ½ banana or 1/2 cup lite canned fruit  3 isai cracker squares  3 cups Smart Pop popcorn, top spray butter, Schafer lite salt or cinnamon and Truvia  5 Vanilla Wafers  ½ cup low fat, no added sugar ice cream or frozen yogurt (Blue bell, Blue Bunny, Weight Watchers, Skinny Cow)  1/2 - 1 cup Light n' fit Vanilla yogurt (has added protein in it to make you feel full).   ½ turkey, ham, or chicken sandwich  ½ c fruit with ½ c Cottage cheese  4-6 unsalted wheat crackers with 1 oz low fat cheese or 1 tbsp peanut butter   30-45 goldfish crackers (depending on flavor)   7-8 Hinduism mini brown rice cakes (caramel, apple cinnamon, chocolate)   12 Hinduism mini brown rice cakes (cheddar, bbq, ranch)   1/3 cup hummus dip with raw veg  1/2 whole wheat verito, 1Tbsp hummus  Mini Pizza (1/2 whole wheat English muffin, low-fat  cheese, tomato sauce)  100 calorie snack pack (Oreo, Chips Ahoy, Ritz Mix, Baked Cheetos)  4-6 oz. light or Greek Style yogurt (Chobani, Yoplait, Okios, Stoneyfield)  ½ cup sugar-free pudding    6 in. wheat tortilla or verito oven toasted chips (topped with spray butter flavoring, cinnamon, Truvia OR spray butter, garlic powder, chili powder)   18 BBQ Popchips (available at [employer], Whole Foods, [employer] Market)  Mini bagel (small size) toasted - add fat free cream cheese or avocado and sliced tomato on top - yum!   1/2 cup Halo top icecream - birthday cake is my go-to flavor.   Truth Bars - " "can be found at Fresh market - Net carbs is 11 - 12 grams depending on the flavor.   Kind bars = mostly nuts and dried berries - find at most local groceries stores, drug stores, whole foods, fresh market. Net carbs is around 10 grams.     Smoothie Mor - I'm often asked "what smoothie is healthy for me". Get the 20 oz. Size.   Do not be decieved to think that all smoothies are "healthy". In fact, most are loaded with hidden sugars.   Here are some from the menu that you are allowed to have being diabetic:   The gladiator - any flavor. This is the lowest in carbs (3 - 5 grams only)  Keto champ (berry, chocolate or coffee - all have 14 - 19 grams of carbs in 20 oz size).   The Lean 1 smoothies - vanilla, strawberry and chocolate have under 30 grams of carbs, so this is slightly more. But would be ok for a meal substitute or snack.   The Shredder in Vanilla or chocolate only.  17 grams of carbs - (the strawberry has more sugar considerably)    Tips and Tricks:     Eat an extra vegetable once per day - green veggies (such as broccoli, cauliflower, green beans) - make you feel full and are low in sugar.     My favorite "secret" seasoning to make things extra yummy is cinnamon for sweet taste   For an added secret "salty" taste - add "everything but the bagel" seasoning (you can get on amazon.com or at  joes. I have seen at local groceries such as [employer] too!).     Dark colored fruits are your friend -- blueberries, strawberries, raspberries, blackberries. They have a lower sugar content. So will be the best choice for you.   Light colored fruits are NOT your friend - they tend to be higher in sugar and are not the best options for an ADA diet - examples are oranges, bananas, peaches, watermelon, -- you can eat these, but carefully and in moderation.     WATER. I cannot emphasize drinking water enough - it will hydrate you, make you full. Your body needs it - it's a natural appetite suppressant.   Sometimes hunger " "and thirst can feel the same. Try drinking some water first, then eat if you are still hungry.     Other snack choices   Celery with peanut butter  Celery with tuna salad  Dill pickles and cheddar cheese (no kidding, it's a great combo)  Nuts (keep raw ones in the freezer if you think you'll overeat them)  Sunflower seeds (get them in the shell so it will take longer to eat them)  Other seeds (How to Toast Pumpkin or Squash Seeds)   Pistachios or almonds - will fill you up and are tasty!   Low-Carb Trail Mix  Jerky (beef or turkey -- try to find low-sugar varieties)  Salami slices (you can find in the deli section)  Cheese sticks, such as string cheese  Sugar-free Jello, alone or with cottage cheese and a sprinkling of nuts. Make sugar-free lime Jello with part coconut milk -- For a large package, dissolve the powder in a cup of boiling water, add a can of coconut milk, and then add the rest of the water. Stir well.  Pepperoni "chips" -- Zap the slices in the microwave  Cheese with a few apple slices  4-ounce plain or sugar-free yogurt with berries and flax seed meal  Smoked salmon and cream cheese on cucumber slices  Lettuce Roll-ups -- Roll luncheon meat, egg salad, tuna or other filling and veggies in lettuce leaves  Lunch Meat Roll-ups -- Roll cheese or veggies in lunch meat (read the labels for carbs on the lunch meat)  Spread bean dip, spinach dip, or other low-carb dip or spread on the lunch meat or lettuce and then roll it up  Raw veggies and spinach dip, or other low-carb dip  Pork rinds (Chicharrón), with or without dip  Ricotta cheese with fruit and/or nuts and/or flax seed meal  Mushrooms with cheese spread inside (or other spreads or dips)  Low-carb snack bars (watch out for sugar alcohols, especially maltitol)  Product Review: Atkins Advantage Bars  Pepperoni Chips -- Microwave pepperoni slices until crisp. Great with cheeses and dips  Garlic Parmesan Flax Seed Crackers  Parmesan Crisps -- Good when " you want a crunchy snack.  Peanut Butter Protein Balls

## 2023-09-27 ENCOUNTER — LAB VISIT (OUTPATIENT)
Dept: LAB | Facility: HOSPITAL | Age: 33
End: 2023-09-27
Payer: COMMERCIAL

## 2023-09-27 DIAGNOSIS — E11.9 TYPE 2 DIABETES MELLITUS WITHOUT COMPLICATION, WITHOUT LONG-TERM CURRENT USE OF INSULIN: ICD-10-CM

## 2023-09-27 LAB
ALBUMIN SERPL BCP-MCNC: 4.4 G/DL (ref 3.5–5.2)
ALP SERPL-CCNC: 83 U/L (ref 55–135)
ALT SERPL W/O P-5'-P-CCNC: 20 U/L (ref 10–44)
ANION GAP SERPL CALC-SCNC: 6 MMOL/L (ref 8–16)
AST SERPL-CCNC: 16 U/L (ref 10–40)
BACTERIA #/AREA URNS AUTO: NORMAL /HPF
BILIRUB SERPL-MCNC: 0.8 MG/DL (ref 0.1–1)
BILIRUB UR QL STRIP: NEGATIVE
BILIRUB UR QL STRIP: NEGATIVE
BUN SERPL-MCNC: 11 MG/DL (ref 6–20)
C PEPTIDE SERPL-MCNC: 0.77 NG/ML (ref 0.78–5.19)
CALCIUM SERPL-MCNC: 9.7 MG/DL (ref 8.7–10.5)
CHLORIDE SERPL-SCNC: 102 MMOL/L (ref 95–110)
CLARITY UR REFRACT.AUTO: CLEAR
CLARITY UR REFRACT.AUTO: CLEAR
CO2 SERPL-SCNC: 29 MMOL/L (ref 23–29)
COLOR UR AUTO: YELLOW
COLOR UR AUTO: YELLOW
CREAT SERPL-MCNC: 0.8 MG/DL (ref 0.5–1.4)
EST. GFR  (NO RACE VARIABLE): >60 ML/MIN/1.73 M^2
GLUCOSE SERPL-MCNC: 259 MG/DL (ref 70–110)
GLUCOSE UR QL STRIP: ABNORMAL
GLUCOSE UR QL STRIP: ABNORMAL
HGB UR QL STRIP: NEGATIVE
HGB UR QL STRIP: NEGATIVE
KETONES UR QL STRIP: ABNORMAL
KETONES UR QL STRIP: ABNORMAL
LEUKOCYTE ESTERASE UR QL STRIP: NEGATIVE
LEUKOCYTE ESTERASE UR QL STRIP: NEGATIVE
MICROSCOPIC COMMENT: NORMAL
NITRITE UR QL STRIP: NEGATIVE
NITRITE UR QL STRIP: NEGATIVE
NON-SQ EPI CELLS #/AREA URNS AUTO: 0 /HPF
PH UR STRIP: 6 [PH] (ref 5–8)
PH UR STRIP: 6 [PH] (ref 5–8)
POTASSIUM SERPL-SCNC: 3.9 MMOL/L (ref 3.5–5.1)
PROT SERPL-MCNC: 7.5 G/DL (ref 6–8.4)
PROT UR QL STRIP: NEGATIVE
PROT UR QL STRIP: NEGATIVE
RBC #/AREA URNS AUTO: 0 /HPF (ref 0–4)
SODIUM SERPL-SCNC: 137 MMOL/L (ref 136–145)
SP GR UR STRIP: 1.01 (ref 1–1.03)
SP GR UR STRIP: 1.01 (ref 1–1.03)
URN SPEC COLLECT METH UR: ABNORMAL
URN SPEC COLLECT METH UR: ABNORMAL
WBC #/AREA URNS AUTO: 0 /HPF (ref 0–5)
YEAST UR QL AUTO: NORMAL

## 2023-09-27 PROCEDURE — 80053 COMPREHEN METABOLIC PANEL: CPT | Performed by: NURSE PRACTITIONER

## 2023-09-27 PROCEDURE — 86341 ISLET CELL ANTIBODY: CPT | Mod: 91 | Performed by: NURSE PRACTITIONER

## 2023-09-27 PROCEDURE — 81001 URINALYSIS AUTO W/SCOPE: CPT | Performed by: NURSE PRACTITIONER

## 2023-09-27 PROCEDURE — 86341 ISLET CELL ANTIBODY: CPT | Performed by: NURSE PRACTITIONER

## 2023-09-27 PROCEDURE — 36415 COLL VENOUS BLD VENIPUNCTURE: CPT | Mod: PO | Performed by: NURSE PRACTITIONER

## 2023-09-27 PROCEDURE — 84681 ASSAY OF C-PEPTIDE: CPT | Performed by: NURSE PRACTITIONER

## 2023-10-01 LAB — PANC ISLET CELL IGG SER-ACNC: NORMAL

## 2023-10-03 LAB — GAD65 AB SER-SCNC: 0.03 NMOL/L

## 2023-10-05 ENCOUNTER — CLINICAL SUPPORT (OUTPATIENT)
Dept: DIABETES | Facility: CLINIC | Age: 33
End: 2023-10-05
Payer: COMMERCIAL

## 2023-10-05 ENCOUNTER — PATIENT MESSAGE (OUTPATIENT)
Dept: INTERNAL MEDICINE | Facility: CLINIC | Age: 33
End: 2023-10-05
Payer: COMMERCIAL

## 2023-10-05 DIAGNOSIS — E11.65 TYPE 2 DIABETES MELLITUS WITH HYPERGLYCEMIA, WITH LONG-TERM CURRENT USE OF INSULIN: Primary | ICD-10-CM

## 2023-10-05 DIAGNOSIS — E11.9 TYPE 2 DIABETES MELLITUS WITHOUT COMPLICATION, WITHOUT LONG-TERM CURRENT USE OF INSULIN: ICD-10-CM

## 2023-10-05 DIAGNOSIS — E11.9 TYPE 2 DIABETES MELLITUS WITHOUT COMPLICATION, WITH LONG-TERM CURRENT USE OF INSULIN: Primary | ICD-10-CM

## 2023-10-05 DIAGNOSIS — Z79.4 TYPE 2 DIABETES MELLITUS WITHOUT COMPLICATION, WITH LONG-TERM CURRENT USE OF INSULIN: Primary | ICD-10-CM

## 2023-10-05 DIAGNOSIS — Z79.4 TYPE 2 DIABETES MELLITUS WITH HYPERGLYCEMIA, WITH LONG-TERM CURRENT USE OF INSULIN: Primary | ICD-10-CM

## 2023-10-05 PROCEDURE — 99999 PR PBB SHADOW E&M-EST. PATIENT-LVL I: CPT | Mod: PBBFAC,,, | Performed by: DIETITIAN, REGISTERED

## 2023-10-05 PROCEDURE — 99999 PR PBB SHADOW E&M-EST. PATIENT-LVL I: ICD-10-PCS | Mod: PBBFAC,,, | Performed by: DIETITIAN, REGISTERED

## 2023-10-05 PROCEDURE — G0108 DIAB MANAGE TRN  PER INDIV: HCPCS | Mod: S$GLB,,, | Performed by: DIETITIAN, REGISTERED

## 2023-10-05 PROCEDURE — G0108 PR DIAB MANAGE TRN  PER INDIV: ICD-10-PCS | Mod: S$GLB,,, | Performed by: DIETITIAN, REGISTERED

## 2023-10-05 RX ORDER — BLOOD-GLUCOSE SENSOR
1 EACH MISCELLANEOUS
Qty: 3 EACH | Refills: 11 | Status: SHIPPED | OUTPATIENT
Start: 2023-10-05 | End: 2024-10-04

## 2023-10-05 RX ORDER — BLOOD-GLUCOSE SENSOR
1 EACH MISCELLANEOUS
Qty: 3 EACH | Refills: 11 | Status: SHIPPED | OUTPATIENT
Start: 2023-10-05 | End: 2023-10-05

## 2023-10-05 NOTE — PROGRESS NOTES
Diabetes Care Specialist Progress Note  Author: Ondina Bass RD, CDE  Date: 10/5/2023    Program Intake  Reason for Diabetes Program Visit:: Initial Diabetes Assessment  Current diabetes risk level:: high  In the last 12 months, have you:: none    Lab Results   Component Value Date    HGBA1C 10.5 (H) 09/12/2023       Clinical    Problem Review  Reviewed Problem List with Patient: yes  Active comorbidities affecting diabetes self-care.: no  Reviewed health maintenance: yes    Clinical Assessment  Current Diabetes Treatment: Insulin  Have you ever experienced hypoglycemia (low blood sugar)?: no  Have you ever experienced hyperglycemia (high blood sugar)?: yes  Are you able to tell when your blood sugar is high?: Yes  What are your symptoms?: fatigue, frequent urination, thirst, nausea  Have you ever been hospitalized because your blood sugar was high?: no    Medication Information  How do you obtain your medications?: Patient drives  How many days a week do you miss your medications?: Never  Do you sometimes have difficulty refilling your medications?: No  Medication adherence impacting ability to self-manage diabetes?: No    Labs  Do you have regular lab work to monitor your medications?: Yes  Type of Regular Lab Work: A1c    Nutritional Status  Diet: Regular (2 meals daily; snacks during the day; drinks water, SF monster drinks, coke zero, powerade zero; stopped ETOH)  Change in appetite?: No  Dentation:: Intact  Recent Changes in Weight: Weight Loss  Was weight loss intentional or unintentional?: Intentional  Current nutritional status an area of need that is impacting patient's ability to self-manage diabetes?: No    Additional Social History    Support  Does anyone support you with your diabetes care?: yes  Who supports you?: family member, self  Who takes you to your medical appointments?: self  Does the current support meet the patient's needs?: Yes  Is Support an area impacting ability to self-manage  diabetes?: No    Access to Mass Media & Technology  Does the patient have access to any of the following devices or technologies?: Smart phone  Media or technology needs impacting ability to self-manage diabetes?: No    Cognitive/Behavioral Health  Alert and Oriented: Yes  Difficulty Thinking: No  Requires Prompting: No  Requires assistance for routine expression?: No  Cognitive or behavioral barriers impacting ability to self-manage diabetes?: No    Culture/Lutheran  Culture or Rastafarian beliefs that may impact ability to access healthcare: No    Communication  Language preference: English  Hearing Problems: No  Vision Problems: No  Communication needs impacting ability to self-manage diabetes?: No    Health Literacy  Preferred Learning Method: Face to Face  How often do you need to have someone help you read instructions, pamphlets, or written material from your doctor or pharmacy?: Never  Health literacy needs impacting ability to self-manage diabetes?: No      Diabetes Self-Management Skills Assessment    Diabetes Disease Process/Treatment Options  Patient/caregiver able to state what happens when someone has diabetes.: somewhat  Patient/caregiver knows what type of diabetes they have.: yes  Diabetes Type : Type I  Patient/caregiver able to identify at least three signs and symptoms of diabetes.: yes  Identified signs and symptoms:: fatigue, frequent urination, increased thirst  Patient able to identify at least three risk factors for diabetes.: yes  Identified risk factors:: age over 40, being overweight, family history  Diabetes Disease Process/Treatment Options: Skills Assessment Completed: Yes  Assessment indicates:: Adequate understanding  Area of need?: No    Nutrition/Healthy Eating  Method of carbohydrate measurement:: portion plate  Patient can identify foods that impact blood sugar.: yes  Patient-identified foods:: sweets, soda  Nutrition/Healthy Eating Skills Assessment Completed:: Yes  Assessment  indicates:: Instruction Needed  Area of need?: Yes    Physical Activity/Exercise  Patient's daily activity level:: sedentary  Patient formally exercises outside of work.: no  Patient can identify forms of physical activity.: yes  Stated forms of physical activity:: any movement performed by muscles that uses energy  Patient can identify reasons why exercise/physical activity is important in diabetes management.: no  Physical Activity/Exercise Skills Assessment Completed: : Yes  Assessment indicates:: Instruction Needed  Area of need?: Yes    Medications  Patient is able to describe current diabetes management routine.: yes  Diabetes management routine:: insulin  Patient is able to identify current diabetes medications, dosages, and appropriate timing of medications.: yes  Patient understands the purpose of the medications taken for diabetes.: yes  Patient reports problems or concerns with current medication regimen.: no  Medication Skills Assessment Completed:: Yes  Assessment indicates:: Instruction Needed  Area of need?: Yes    Home Blood Glucose Monitoring  Patient states that blood sugar is checked at home daily.: yes  Monitoring Method:: home glucometer  How often do you check your blood sugar?: Occasionally  Home Blood Glucose Monitoring Skills Assessment Completed: : Yes  Assessment indicates:: Instruction Needed  Area of need?: Yes    Acute Complications  Patient is able to identify types of acute complications: No  Acute Complications Skills Assessment Completed: : Yes  Assessment indicates:: Instruction Needed  Area of need?: Yes    Chronic Complications  Patient can identify major chronic complications of diabetes.: no  Patient is taking statin?: Yes  Chronic Complications Skills Assessment Completed: : Yes  Assessment indicates:: Instruction Needed  Area of need?: Yes    Psychosocial/Coping  Patient can identify ways of coping with chronic disease.: yes  Patient-stated ways of coping with chronic  disease:: support from loved ones  Psychosocial/Coping Skills Assessment Completed: : Yes  Assessment indicates:: Adequate understanding  Area of need?: No      Assessment Summary and Plan    Based on today's diabetes care assessment, the following areas of need were identified:          10/5/2023    12:01 AM   Social   Support No   Access to Mass Media/Tech No   Cognitive/Behavioral Health No   Culture/Latter-day No   Communication No   Health Literacy No            10/5/2023    12:01 AM   Clinical   Medication Adherence No   Nutritional Status No            10/5/2023    12:01 AM   Diabetes Self-Management Skills   Diabetes Disease Process/Treatment Options No   Nutrition/Healthy Eating Yes  - reviewed CHO counting, label reading and addt'l resources to assist w/ CHO counting, blanco method reviewed, alternatives to sugary beverages discussed   Physical Activity/Exercise Yes - reviewed goals and benefits   Medication Yes - reviewed MOA of Levemir and regimen; reviewed proper use of Levemir pen. Patient states he has been taking Levemir 15 units, but finds BGs are still in 300s. Advised to increase Levemir by 2 units every 3 days if fasting BG still above 150   Home Blood Glucose Monitoring Yes - reviewed SMBG schedule and BG goals; Dexcom sample placed today. Linked to [facility] - UN: [phone]; PW: Dexcom G7. Rx request sent to provider for supplies.   Acute Complications Yes - reviewed s/s and treatment of hypo/hyperglycemia   Chronic Complications Yes - reviewed standards of care   Psychosocial/Coping No          Today's interventions were provided through individual discussion, instruction, and written materials were provided.      Patient verbalized understanding of instruction and written materials.  Pt was able to return back demonstration of instructions today. Patient understood key points, needs reinforcement and further instruction.     Diabetes Self-Management Care Plan:    Today's Diabetes Self-Management  Care Plan was developed with Nader's input. Nader has agreed to work toward the following goal(s) to improve his/her overall diabetes control.      Care Plan: Diabetes Management   Updates made since 9/5/2023 12:00 AM        Problem: Blood Glucose Self-Monitoring         Long-Range Goal: Use Dexcom G7 as instructed    Start Date: 10/5/2023   Expected End Date: 4/5/2024   Priority: High   Barriers: No Barriers Identified        Task: Instructed patient on use of the Dexcom G7 Completed 10/5/2023          Follow Up Plan     Follow up in about 6 months (around 4/5/2024).    Today's care plan and follow up schedule was discussed with patient.  Nader verbalized understanding of the care plan, goals, and agrees to follow up plan.        The patient was encouraged to communicate with his/her health care provider/physician and care team regarding his/her condition(s) and treatment.  I provided the patient with my contact information today and encouraged to contact me via phone or [facility]'s Patient Portal as needed.     Length of Visit   Total Time: 60 Minutes

## 2023-10-25 ENCOUNTER — PATIENT OUTREACH (OUTPATIENT)
Dept: ADMINISTRATIVE | Facility: HOSPITAL | Age: 33
End: 2023-10-25
Payer: COMMERCIAL

## 2023-10-25 NOTE — PROGRESS NOTES
Health Maintenance Due   Topic Date Due    Hepatitis C Screening  Never done    COVID-19 Vaccine (1) Never done    Pneumococcal Vaccines (Age 0-64) (1 - PCV) Never done    HIV Screening  Never done    TETANUS VACCINE  05/16/2013    Foot Exam  06/25/2022    Influenza Vaccine (1) Never done     Chart reviewed.   Immunizations: Reconciled  Orders placed: N/A  Upcoming appts to satisfy MILKA topics: Optometry 11/21/2023, Labs 12/5/2023

## 2023-11-08 ENCOUNTER — OFFICE VISIT (OUTPATIENT)
Dept: INTERNAL MEDICINE | Facility: CLINIC | Age: 33
End: 2023-11-08
Payer: COMMERCIAL

## 2023-11-08 VITALS
TEMPERATURE: 99 F | DIASTOLIC BLOOD PRESSURE: 72 MMHG | RESPIRATION RATE: 16 BRPM | BODY MASS INDEX: 26.82 KG/M2 | HEART RATE: 91 BPM | WEIGHT: 191.56 LBS | SYSTOLIC BLOOD PRESSURE: 114 MMHG | OXYGEN SATURATION: 99 % | HEIGHT: 71 IN

## 2023-11-08 DIAGNOSIS — E78.5 HYPERLIPIDEMIA ASSOCIATED WITH TYPE 2 DIABETES MELLITUS: ICD-10-CM

## 2023-11-08 DIAGNOSIS — R79.89 LOW SERUM C-PEPTIDE: ICD-10-CM

## 2023-11-08 DIAGNOSIS — E11.69 HYPERLIPIDEMIA ASSOCIATED WITH TYPE 2 DIABETES MELLITUS: ICD-10-CM

## 2023-11-08 DIAGNOSIS — E11.9 TYPE 2 DIABETES MELLITUS WITHOUT COMPLICATION, WITHOUT LONG-TERM CURRENT USE OF INSULIN: Primary | ICD-10-CM

## 2023-11-08 PROCEDURE — 99215 PR OFFICE/OUTPT VISIT, EST, LEVL V, 40-54 MIN: ICD-10-PCS | Mod: S$GLB,,, | Performed by: NURSE PRACTITIONER

## 2023-11-08 PROCEDURE — 1159F PR MEDICATION LIST DOCUMENTED IN MEDICAL RECORD: ICD-10-PCS | Mod: CPTII,S$GLB,, | Performed by: NURSE PRACTITIONER

## 2023-11-08 PROCEDURE — 1159F MED LIST DOCD IN RCRD: CPT | Mod: CPTII,S$GLB,, | Performed by: NURSE PRACTITIONER

## 2023-11-08 PROCEDURE — 1160F PR REVIEW ALL MEDS BY PRESCRIBER/CLIN PHARMACIST DOCUMENTED: ICD-10-PCS | Mod: CPTII,S$GLB,, | Performed by: NURSE PRACTITIONER

## 2023-11-08 PROCEDURE — 99999 PR PBB SHADOW E&M-EST. PATIENT-LVL V: CPT | Mod: PBBFAC,,, | Performed by: NURSE PRACTITIONER

## 2023-11-08 PROCEDURE — 3061F NEG MICROALBUMINURIA REV: CPT | Mod: CPTII,S$GLB,, | Performed by: NURSE PRACTITIONER

## 2023-11-08 PROCEDURE — 3078F PR MOST RECENT DIASTOLIC BLOOD PRESSURE < 80 MM HG: ICD-10-PCS | Mod: CPTII,S$GLB,, | Performed by: NURSE PRACTITIONER

## 2023-11-08 PROCEDURE — 3074F PR MOST RECENT SYSTOLIC BLOOD PRESSURE < 130 MM HG: ICD-10-PCS | Mod: CPTII,S$GLB,, | Performed by: NURSE PRACTITIONER

## 2023-11-08 PROCEDURE — 3046F HEMOGLOBIN A1C LEVEL >9.0%: CPT | Mod: CPTII,S$GLB,, | Performed by: NURSE PRACTITIONER

## 2023-11-08 PROCEDURE — 99999 PR PBB SHADOW E&M-EST. PATIENT-LVL V: ICD-10-PCS | Mod: PBBFAC,,, | Performed by: NURSE PRACTITIONER

## 2023-11-08 PROCEDURE — 3046F PR MOST RECENT HEMOGLOBIN A1C LEVEL > 9.0%: ICD-10-PCS | Mod: CPTII,S$GLB,, | Performed by: NURSE PRACTITIONER

## 2023-11-08 PROCEDURE — 3061F PR NEG MICROALBUMINURIA RESULT DOCUMENTED/REVIEW: ICD-10-PCS | Mod: CPTII,S$GLB,, | Performed by: NURSE PRACTITIONER

## 2023-11-08 PROCEDURE — 3066F NEPHROPATHY DOC TX: CPT | Mod: CPTII,S$GLB,, | Performed by: NURSE PRACTITIONER

## 2023-11-08 PROCEDURE — 95251 CONT GLUC MNTR ANALYSIS I&R: CPT | Mod: S$GLB,,, | Performed by: NURSE PRACTITIONER

## 2023-11-08 PROCEDURE — 3008F PR BODY MASS INDEX (BMI) DOCUMENTED: ICD-10-PCS | Mod: CPTII,S$GLB,, | Performed by: NURSE PRACTITIONER

## 2023-11-08 PROCEDURE — 3008F BODY MASS INDEX DOCD: CPT | Mod: CPTII,S$GLB,, | Performed by: NURSE PRACTITIONER

## 2023-11-08 PROCEDURE — 1160F RVW MEDS BY RX/DR IN RCRD: CPT | Mod: CPTII,S$GLB,, | Performed by: NURSE PRACTITIONER

## 2023-11-08 PROCEDURE — 3074F SYST BP LT 130 MM HG: CPT | Mod: CPTII,S$GLB,, | Performed by: NURSE PRACTITIONER

## 2023-11-08 PROCEDURE — 95251 PR GLUCOSE MONITOR, 72 HOUR, PHYS INTERP: ICD-10-PCS | Mod: S$GLB,,, | Performed by: NURSE PRACTITIONER

## 2023-11-08 PROCEDURE — 3066F PR DOCUMENTATION OF TREATMENT FOR NEPHROPATHY: ICD-10-PCS | Mod: CPTII,S$GLB,, | Performed by: NURSE PRACTITIONER

## 2023-11-08 PROCEDURE — 99215 OFFICE O/P EST HI 40 MIN: CPT | Mod: S$GLB,,, | Performed by: NURSE PRACTITIONER

## 2023-11-08 PROCEDURE — 3078F DIAST BP <80 MM HG: CPT | Mod: CPTII,S$GLB,, | Performed by: NURSE PRACTITIONER

## 2023-11-08 RX ORDER — TIRZEPATIDE 2.5 MG/.5ML
2.5 INJECTION, SOLUTION SUBCUTANEOUS
Qty: 4 PEN | Refills: 0 | Status: SHIPPED | OUTPATIENT
Start: 2023-11-08 | End: 2023-12-12

## 2023-11-08 RX ORDER — INSULIN ASPART 100 [IU]/ML
6 INJECTION, SOLUTION INTRAVENOUS; SUBCUTANEOUS
Qty: 15 ML | Refills: 3 | Status: SHIPPED | OUTPATIENT
Start: 2023-11-08 | End: 2023-12-21 | Stop reason: SDUPTHER

## 2023-11-08 RX ORDER — PEN NEEDLE, DIABETIC 30 GX3/16"
1 NEEDLE, DISPOSABLE MISCELLANEOUS 4 TIMES DAILY
Qty: 200 EACH | Refills: 3 | Status: SHIPPED | OUTPATIENT
Start: 2023-11-08 | End: 2024-01-11 | Stop reason: SDUPTHER

## 2023-11-08 RX ORDER — INSULIN DETEMIR 100 [IU]/ML
30 INJECTION, SOLUTION SUBCUTANEOUS NIGHTLY
Qty: 15 ML | Refills: 3 | Status: SHIPPED | OUTPATIENT
Start: 2023-11-08 | End: 2024-01-11 | Stop reason: SDUPTHER

## 2023-11-08 RX ORDER — TIRZEPATIDE 5 MG/.5ML
5 INJECTION, SOLUTION SUBCUTANEOUS
Qty: 4 PEN | Refills: 3 | Status: SHIPPED | OUTPATIENT
Start: 2023-11-08 | End: 2023-12-21

## 2023-11-08 NOTE — PATIENT INSTRUCTIONS
"Start mounjaro 2.5mg every week for the first 4 weeks.   After this, you can increase your dose to 5mg every week - this was sent to your pharmacy.     Continue the Levemir every night - this is long acting insulin -   Increase this from 25 to 30 units every night.     Continue using your dexcom g7 - for safety purposes - you may want to invite followers -   They would need to download the guadalupe "dexcom follow" on their cellphone.    Depending on what your glucose is BEFORE YOU EAT - you MAY need meal time insulin -   I'm giving to you in case - so that your blood sugars don't go so high with eating.     This is called humalog - it works for 3 - 4 hours - and is only intended to give with the food/with the meal.   if bg > 150 - give 6 units   if bg> 201 - give 7 units  if bg> 251 - give 8 units   if bg > 301 - give 9 units   if bg > 351 - give 10 units.    For low blood sugar - remember to keep glucose tablets on hand. You can purchase these at the pharmacy check out desk/over the counter.   If blood sugar is less than 70, take/eat 2 - 3 tablets quickly to bring your sugar up.   Always keep 15 grams of Quick acting carbohydrates on hand to eat/drink if your sugar is low - examples are 1/2 cup of juice, coke, or crackers, granola bars.   Remember to eat meals frequently to prevent low blood blood sugars.    "

## 2023-11-08 NOTE — PROGRESS NOTES
33 y.o male here for routine follow up for management of diabetes -   Last seen 9/26/23 - those notes are below.     Last a1c was at 10.5% in September 2023 -   no new labs for bernabe's visit -   However his cgm estimates him at 11% -   Paternal aunt has T1 diabetes only.  Grandfather also has Diabetes - unknown what type.     Patient is working on diet - has limited a lot of sweets and switched to sugar free drinks.   Weight has gone down from 194 to 191 lbs today's visit.   He is now on insulin - taking levemir 25 units every night. ( I had begun him at 15 units every night, and he has slowly needed to increase dose up)     Feels like this is not working. His blood sugars remain high.   Has met with dietician.   He states he feels very very tired. Feels like falling asleep often.   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   He has a 3 year old son also - partial custody with ex.   Also has a room mate.   Dad lives 1/2 mile away, so states he has a good support system.     In the interim I did do blood work to eval for type 1 diabetes -   Cpeptide came back present but on lower end.   Weight based needs for insulin based on 191 lbs - is 43 units of insulin total per day -     C-Peptide 0.78 - 5.19 ng/mL 0.77 Low   1.07      Component Ref Range & Units 1 mo ago 2 yr ago   Glutamic Acid Decarb Ab <=0.02 nmol/L 0.03 High   0.01 CM      Islet Cell Ab <1:4 <1:4  <1:4 CM      Is wearing new personal Dexcom g7 - gets supplies from pharmacy.   downloaded and reviewed today -   See scanned in .   Average glucose is 320 -   Gmi is estimated at 11% -   1% time in range.   10% highs.   89% very highs.       Last visit notes as follows from September 2023 -   33 y.o. male here for 2 year follow up for management of T2dm -   Last seen aguust 2021 - those notes below.     Hgba1c is still elevated, from 9.8% ---> 10.5%   Recently stopped drinking alcohol - was drinking socially on  "weekends - 15 beers over 2 days time.   Is trying to make some diet changes - cutting out chips -   Is feeling bad -- having diarrhea and ED, drinking a lot of water, feels thirsty.   Urination more.     He had been taking Xigduo XR 5/1000 mg tablet daily in morning - it seemed to   Not checking blood sugars   Does not have a glucometer.   Diet- fast food, processed foods, and changed diet a couple weeks when he saw his recent labs.   Eating lean meats, veggies.   Some fried food, and potatoes.   Reports was closer to 250 lbs and is now around 194 lbs.     Labs done to r/o type 1 in 2021 were negative, has not had repeat work up.   Glucose today - 3 hours post prandial - fasting at level of 323.   Had a burrito for lunch with chips.   Urine dip done -   Clean voided - yellow -   Clear -   1005   7   Negative -   Ph 7 -   Leukocytes +  Nitrites -   Protein +   1000+   Moderate amount of ketones  Urobili+  Negative for bilirubin   Negative blood             Last visit notes as follows from 8/5/2021 -   30 y.o. male, here for follow up visit for management of type 2 diabetes.   Today is his 6 week follow up -     Was initially on metformin and then I switched him to xigduo 5/1000mg   He is not taking daily - admits has only taken a total of 7 to 10 tablets possibly.   cpeptide was detected on labwork - so treating as T2dm.    Met with PAT Early for DE on 7/16/2021 -   Today, he ate breakfast - a bag of cookies out of vending machine at 4 am (he has to get to work early).   His sugar today - 5 hours later is 220. We checked here in the office.   While his sugars are decreasing, he is not near goal.   He was checking his sugars almost daily in the morning time, however his lancet device broke - and he needs a new one.   Showed me his logs below:   States when he is "in the green" - he is on the xigduo -   The red glucose levels below are when he is not on the medication -   He is remaining active, he has a 7 month " old son - so is busy many weekends taking care of him.             Last visit notes from June 2021 as follows:   HPI: Nader Cardona is a 33 y.o.  male c/I for visit to address Diabetes Type 2  This is the first time I am seeing them for care, follows with Ab Man MD for primary care needs.   Has not seen endocrinology or diabetes specialist in the past.     was diagnosed with T2DM about 2 years ago -   Paternal aunt has T1 diabetes only.  Grandfather also has Diabetes - unknown what type.   Has never been hospitalized r/t DM.    a1c is elevated @ 9.8%.   He just began on metformin 500mg tablets - 2 per day. Having diarrhea - right after taking it.   But not an issue while at work.   He's been on metformin for 1 month now.   Admits eats lots of fast food/lives by himself. Lots of high carb snacks.   Smokes cigar on occasion -   Drinks ETOH socially on weekend - beer mostly, vodka/soda water.   Doesn't check blood sugars - knows how.   His fasting bg today in clinic is 255   Feeling down as his dad just passed away last month of lung cancer, also has new 6 month old son/baby.   He denies depression however. We discussed medication for now or referral to counselor, but he politely declined during the visit.     Complications from diabetes include:   Negative for DKA.   Has never taken insulin in life.   -negative for diabetic neuropathy.   -negative for nephropathy.   No microalbuminuria test done.   Eyes - negative for Diabetic retinopathy - cyst in right eye - outside facility in [place] - eye care.   Denies CV disease.   Denies known CAD.   HTN - none  HLD - controlled - just begun on statin.     Past medical History:   Past Medical History:   Diagnosis Date    Asthma     Diabetes mellitus, type 2       Family hx:   Family History   Problem Relation Age of Onset    Cancer Mother         Breast Cancer    Cancer Father         Stage 4 lung cancer and throat cancer      Current meds:   Current  "Outpatient Medications:     atorvastatin (LIPITOR) 10 MG tablet, Take 1 tablet (10 mg total) by mouth once daily., Disp: 90 tablet, Rfl: 3    blood sugar diagnostic (ONETOUCH VERIO TEST STRIPS) Strp, 1 each by Misc.(Non-Drug; Combo Route) route 2 (two) times daily before meals., Disp: 200 each, Rfl: 1    blood-glucose meter kit, Use as instructed, Disp: 1 each, Rfl: 0    blood-glucose sensor (DEXCOM G7 SENSOR) Jazmin, 1 each by Misc.(Non-Drug; Combo Route) route every 10 days., Disp: 3 each, Rfl: 11    lancets (LANCETS,THIN) Misc, 1 each by Misc.(Non-Drug; Combo Route) route 2 (two) times daily before meals., Disp: 200 each, Rfl: 3    ondansetron (ZOFRAN-ODT) 4 MG TbDL, Take 1 tablet (4 mg total) by mouth 2 (two) times daily., Disp: 14 tablet, Rfl: 0    blood-glucose meter kit, Checks blood sugars 2x/daily. (Patient not taking: Reported on 2/24/2023), Disp: 1 each, Rfl: 0    insulin aspart U-100 (NOVOLOG FLEXPEN U-100 INSULIN) 100 unit/mL (3 mL) InPn pen, Inject 6 Units into the skin before meals as needed (if bg > 150 - give 6 units, if bg> 201 - give 7 untis, if bg> 251 - give 8 units, if bg > 301 - give 9 units, if bg > 351 - give 10 units.)., Disp: 15 mL, Rfl: 3    insulin detemir U-100, Levemir, (LEVEMIR FLEXPEN) 100 unit/mL (3 mL) InPn pen, Inject 30 Units into the skin every evening., Disp: 15 mL, Rfl: 3    pen needle, diabetic 32 gauge x 5/32" Ndle, Inject 1 each into the skin 4 (four) times daily., Disp: 200 each, Rfl: 3    tirzepatide (MOUNJARO) 2.5 mg/0.5 mL PnIj, Inject 2.5 mg into the skin every 7 days., Disp: 4 pen , Rfl: 0    tirzepatide (MOUNJARO) 5 mg/0.5 mL PnIj, Inject 5 mg into the skin every 7 days., Disp: 4 pen , Rfl: 3     Current Diabetes medications:   Levemir 25 units daily -     Medications Tried and Failed:   Metformin 500 mg - 2 tablets per day.   xigduo XR 5/1000mg tablet daily in morning.     Review of Pertinent co-morbidities/risk factors:   CV: Denies history of MI nor stroke.   CAD: " "Denies.  Takes aspirin 81mg tablet daily  BP: has history of HTN  Statin: Taking  ACE/ARB: Not taking    Social History     Tobacco Use   Smoking Status Never   Smokeless Tobacco Never      Social:   Lives at home by himself. Has a 6 month old son - is not in relationship with the mother  Life changes/stressors currently: Father  last month of Stage 4 lung cancer - so very stressed about that.   Also has 3 year old son.   Diet: not following ADA diet - but has cut down a lot on food.   Meals: 2 - 3 per day and snacks       Breakfast - none.        Lunch - fried chicken or hamburger/fries/diet coke or coke zero - used to drink sprite       Dinner - same - fast food - tacos, fried chicken, burgers, chinese food.        Snacks - cheeze its, pringles, little lula chocolate swiss cake rolls. Cookies        Goes to [employer] machine to get food often because little time.         Drinks- sprite, diet coke   Exercise: none.   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     Glucose Monitoring:   Is checking glucose with new personal dexcom g7 -     Standards of care:   Eyes: .: 2021  Foot exam: : 2021   Diabetes education: yes 2021 -    Vital Signs  /72 (BP Location: Left arm, Patient Position: Sitting, BP Method: Large (Manual))   Pulse 91   Temp 98.5 °F (36.9 °C) (Temporal)   Resp 16   Ht 5' 11" (1.803 m)   Wt 86.9 kg (191 lb 9.3 oz)   SpO2 99%   BMI 26.72 kg/m²     Pertinent Labs:   Hgba1c   Lab Results   Component Value Date    HGBA1C 10.5 (H) 2023    HGBA1C 9.8 (H) 2021     Lipid panel   Lab Results   Component Value Date    CHOL 141 2023    CHOL 157 2021     Lab Results   Component Value Date    HDL 34 (L) 2023    HDL 45 2021     Lab Results   Component Value Date    LDLCALC 56.2 (L) 2023    LDLCALC 70.0 2021     Lab Results   Component Value " Date    TRIG 254 (H) 09/12/2023    TRIG 210 (H) 06/07/2021     Lab Results   Component Value Date    CHOLHDL 24.1 09/12/2023    CHOLHDL 28.7 06/07/2021      CMP  Glucose   Date Value Ref Range Status   09/27/2023 259 (H) 70 - 110 mg/dL Final     BUN   Date Value Ref Range Status   09/27/2023 11 6 - 20 mg/dL Final     Creatinine   Date Value Ref Range Status   09/27/2023 0.8 0.5 - 1.4 mg/dL Final     eGFR if    Date Value Ref Range Status   06/25/2021 >60.0 >60 mL/min/1.73 m^2 Final     eGFR if non    Date Value Ref Range Status   06/25/2021 >60.0 >60 mL/min/1.73 m^2 Final     Comment:     Calculation used to obtain the estimated glomerular filtration  rate (eGFR) is the CKD-EPI equation.        AST   Date Value Ref Range Status   09/27/2023 16 10 - 40 U/L Final     ALT   Date Value Ref Range Status   09/27/2023 20 10 - 44 U/L Final     Microalbumin creatinine ratio:   Lab Results   Component Value Date    MICALBCREAT Unable to calculate 09/12/2023       Review Of Systems:   Gen: Appetite good, + weight loss, + fatigue and weakness. + polydipsia.  Skin: Skin is intact and heals well, denies any rashes or hair changes.   EENT: Denies any acute visual disturbances, nor blurred vision.   Resp: Denies SOB or Dyspnea on exertion, denies cough.   Cardiac: Denies chest pain, palpitations, or swelling.   GI: Denies abdominal pain, nausea or vomiting, diarrhea, or constipation.   /GYN: + nocturia and polyuria,  no burning, + frequency, no pain on urination. + ED  MS/Neuro: Denies numbness/ tingling in BLE; Gait steady, speech clear  Psych: Denies drug/ETOH abuse, no hx of depression.  Other systems: negative.    Physical Exam:   GENERAL: Well developed, well nourished in appearance.   PSYCH: AAOx3, appropriate mood and affect, pleasant expression, conversant, appears relaxed, well groomed.   EYES: PERRL, Conjunctiva and corneas clear  NECK: Soft and Supple, trachea midline,  CHEST: Even,  "regular, and unlabored respirations  ABDOMEN: Soft, non-tender, non-distended.   VASCULAR: pedal pulses palpable bilaterally, no edema.  NEURO:  cranial nerves II - XII intact   MUSCULOSKELETAL: Good ROM, steady gait.   SKIN: Skin warm, dry, and intact -     Assessment and Plan of Care:     Nader was seen today for follow-up and diabetes.    Diagnoses and all orders for this visit:    Type 2 diabetes mellitus without complication, without long-term current use of insulin  -     tirzepatide (MOUNJARO) 2.5 mg/0.5 mL PnIj; Inject 2.5 mg into the skin every 7 days.  -     tirzepatide (MOUNJARO) 5 mg/0.5 mL PnIj; Inject 5 mg into the skin every 7 days.  -     insulin detemir U-100, Levemir, (LEVEMIR FLEXPEN) 100 unit/mL (3 mL) InPn pen; Inject 30 Units into the skin every evening.  -     insulin aspart U-100 (NOVOLOG FLEXPEN U-100 INSULIN) 100 unit/mL (3 mL) InPn pen; Inject 6 Units into the skin before meals as needed (if bg > 150 - give 6 units, if bg> 201 - give 7 untis, if bg> 251 - give 8 units, if bg > 301 - give 9 units, if bg > 351 - give 10 units.).  -     pen needle, diabetic 32 gauge x 5/32" Ndle; Inject 1 each into the skin 4 (four) times daily.    Hyperlipidemia associated with type 2 diabetes mellitus    Low serum C-peptide       1. T2DM with hyperglycemia- with low cpeptide - presentation - consistent with type 1 - so treating as such until seeing if glp1 works -    Hgba1c goal is 7.5% or less without hypoglycemia - 9.8% --> 10.5% currently.   Cgm estimates him at 11% -   discussed DM, progression of disease, long term complications, CV risk factors and tx options.   Advise compliance with ADA diet and encourage exercise  DE consulted for teaching and return demonstration -   Levemir 25 units daily. - increase to 30 units every night.   Start meal time insulin -   See scale above - novolog - give if bg > 150 -   Will start mounjaro 2.5mg every week x 4 weeks - then increase to 5 mg weekly if tolerating " well.   We discussed in full changing diet dramatically - he is working on this.    Instructed to monitor Blood glucose 2x/day before meals and bring meter/ log to every clinic visit. Sent in rx for meter, and supplies-     2. Blood Pressure- controlled today -     3. Lipids- LDL goal < 100.  At goal. Trigs high - likely diet related.   Currently was just begun on zocor. To continue.     4. Weight - BMI Body mass index is 26.72 kg/m².   Encourage Ada diet and exercise.   Start glp1 - will try gip1.     5. Renal Function - stable.   No history of nephropathy.   Will follow.   No urine mac, will obtain.     6. Vitamin D def - on replacement - defer to primarcy        Increase basal insulin - start meal time insulin prn if bg > 150   Trial mounjaro - but presentation looks to be type 1 -   And patient has family history and is symptomatic.   Continue on dexcom g7 - use dexcom follow.   Dumping sugar and + ketones on initial visit   Recheck in 6 weeks time.

## 2023-11-21 ENCOUNTER — OFFICE VISIT (OUTPATIENT)
Dept: OPTOMETRY | Facility: CLINIC | Age: 33
End: 2023-11-21
Payer: COMMERCIAL

## 2023-11-21 DIAGNOSIS — E11.9 DIABETIC EYE EXAM: ICD-10-CM

## 2023-11-21 DIAGNOSIS — E11.9 TYPE 2 DIABETES MELLITUS WITHOUT RETINOPATHY: Primary | ICD-10-CM

## 2023-11-21 DIAGNOSIS — Z01.00 DIABETIC EYE EXAM: ICD-10-CM

## 2023-11-21 PROCEDURE — 3066F PR DOCUMENTATION OF TREATMENT FOR NEPHROPATHY: ICD-10-PCS | Mod: CPTII,S$GLB,, | Performed by: OPTOMETRIST

## 2023-11-21 PROCEDURE — 3046F HEMOGLOBIN A1C LEVEL >9.0%: CPT | Mod: CPTII,S$GLB,, | Performed by: OPTOMETRIST

## 2023-11-21 PROCEDURE — 1160F PR REVIEW ALL MEDS BY PRESCRIBER/CLIN PHARMACIST DOCUMENTED: ICD-10-PCS | Mod: CPTII,S$GLB,, | Performed by: OPTOMETRIST

## 2023-11-21 PROCEDURE — 99999 PR PBB SHADOW E&M-EST. PATIENT-LVL III: CPT | Mod: PBBFAC,,, | Performed by: OPTOMETRIST

## 2023-11-21 PROCEDURE — 92014 PR EYE EXAM, EST PATIENT,COMPREHESV: ICD-10-PCS | Mod: S$GLB,,, | Performed by: OPTOMETRIST

## 2023-11-21 PROCEDURE — 92014 COMPRE OPH EXAM EST PT 1/>: CPT | Mod: S$GLB,,, | Performed by: OPTOMETRIST

## 2023-11-21 PROCEDURE — 99999 PR PBB SHADOW E&M-EST. PATIENT-LVL III: ICD-10-PCS | Mod: PBBFAC,,, | Performed by: OPTOMETRIST

## 2023-11-21 PROCEDURE — 3061F NEG MICROALBUMINURIA REV: CPT | Mod: CPTII,S$GLB,, | Performed by: OPTOMETRIST

## 2023-11-21 PROCEDURE — 3061F PR NEG MICROALBUMINURIA RESULT DOCUMENTED/REVIEW: ICD-10-PCS | Mod: CPTII,S$GLB,, | Performed by: OPTOMETRIST

## 2023-11-21 PROCEDURE — 1159F MED LIST DOCD IN RCRD: CPT | Mod: CPTII,S$GLB,, | Performed by: OPTOMETRIST

## 2023-11-21 PROCEDURE — 3066F NEPHROPATHY DOC TX: CPT | Mod: CPTII,S$GLB,, | Performed by: OPTOMETRIST

## 2023-11-21 PROCEDURE — 1159F PR MEDICATION LIST DOCUMENTED IN MEDICAL RECORD: ICD-10-PCS | Mod: CPTII,S$GLB,, | Performed by: OPTOMETRIST

## 2023-11-21 PROCEDURE — 2023F PR DILATED RETINAL EXAM W/O EVID OF RETINOPATHY: ICD-10-PCS | Mod: CPTII,S$GLB,, | Performed by: OPTOMETRIST

## 2023-11-21 PROCEDURE — 3046F PR MOST RECENT HEMOGLOBIN A1C LEVEL > 9.0%: ICD-10-PCS | Mod: CPTII,S$GLB,, | Performed by: OPTOMETRIST

## 2023-11-21 PROCEDURE — 2023F DILAT RTA XM W/O RTNOPTHY: CPT | Mod: CPTII,S$GLB,, | Performed by: OPTOMETRIST

## 2023-11-21 PROCEDURE — 1160F RVW MEDS BY RX/DR IN RCRD: CPT | Mod: CPTII,S$GLB,, | Performed by: OPTOMETRIST

## 2023-11-21 NOTE — PROGRESS NOTES
HPI    32 Y/o male is here for diabetic eye exam with C/o pt states his vision is   blurry without Rx glasses, pt has Rx glasses but does not wear them.  Pt denies pain and discomfort   No f/f    Eye med: no gtt     Last edited by Yasemin Jaime MA on 11/21/2023  8:36 AM.            Assessment /Plan     For exam results, see Encounter Report.    Type 2 diabetes mellitus without retinopathy    Diabetic eye exam  -     Ambulatory referral/consult to Optometry      DM- WITHOUT RETINOPATHY.  Advised yearly DFE  A few months ago pt went to outside optical shop, and they gave him a pair of glasses that he says doesn't help.  He is 20/20 without spex--discussed no reason to wear spex if he feels they don't help    PLAN:    Rtc 1 yr

## 2023-12-02 ENCOUNTER — OFFICE VISIT (OUTPATIENT)
Dept: URGENT CARE | Facility: CLINIC | Age: 33
End: 2023-12-02
Payer: COMMERCIAL

## 2023-12-02 VITALS
TEMPERATURE: 98 F | DIASTOLIC BLOOD PRESSURE: 89 MMHG | OXYGEN SATURATION: 97 % | HEART RATE: 112 BPM | RESPIRATION RATE: 18 BRPM | BODY MASS INDEX: 26.74 KG/M2 | HEIGHT: 71 IN | SYSTOLIC BLOOD PRESSURE: 127 MMHG | WEIGHT: 191 LBS

## 2023-12-02 DIAGNOSIS — J01.00 ACUTE NON-RECURRENT MAXILLARY SINUSITIS: Primary | ICD-10-CM

## 2023-12-02 PROCEDURE — 99213 OFFICE O/P EST LOW 20 MIN: CPT | Mod: S$GLB,,, | Performed by: PHYSICIAN ASSISTANT

## 2023-12-02 PROCEDURE — 99213 PR OFFICE/OUTPT VISIT, EST, LEVL III, 20-29 MIN: ICD-10-PCS | Mod: S$GLB,,, | Performed by: PHYSICIAN ASSISTANT

## 2023-12-02 RX ORDER — AMOXICILLIN AND CLAVULANATE POTASSIUM 875; 125 MG/1; MG/1
1 TABLET, FILM COATED ORAL EVERY 12 HOURS
Qty: 20 TABLET | Refills: 0 | Status: SHIPPED | OUTPATIENT
Start: 2023-12-02 | End: 2023-12-12

## 2023-12-02 RX ORDER — LORATADINE AND PSEUDOEPHEDRINE SULFATE 5; 120 MG/1; MG/1
1 TABLET, EXTENDED RELEASE ORAL 2 TIMES DAILY
Qty: 20 TABLET | Refills: 0 | COMMUNITY
Start: 2023-12-02 | End: 2023-12-21

## 2023-12-02 NOTE — PROGRESS NOTES
"Subjective:      Patient ID: Nader Cardona is a 33 y.o. male.    Vitals:  height is 5' 11" (1.803 m) and weight is 86.6 kg (191 lb). His oral temperature is 98.1 °F (36.7 °C). His blood pressure is 127/89 and his pulse is 112 (abnormal). His respiration is 18 and oxygen saturation is 97%.     Chief Complaint: Sinus Problem    This is a 33 y.o. male who presents today with a chief complaint of sinus problems .  Patient is facial pain on his left side .  Patient 's Mucous in green in color.      Sinus Problem  This is a new problem. The current episode started today. The problem has been gradually worsening since onset. There has been no fever. His pain is at a severity of 9/10. The pain is severe. Associated symptoms include headaches and sinus pressure. Pertinent negatives include no chills, congestion, coughing, diaphoresis, ear pain, neck pain, shortness of breath or sore throat. Past treatments include nothing. The treatment provided no relief.       Constitution: Negative for chills, sweating, fatigue and fever.   HENT:  Positive for sinus pain and sinus pressure. Negative for ear pain, ear discharge, tinnitus, hearing loss, dental problem, drooling, mouth sores, tongue pain, tongue lesion, congestion, nosebleeds, postnasal drip, sore throat, trouble swallowing and voice change.    Neck: Negative for neck pain, neck stiffness and painful lymph nodes.   Cardiovascular:  Negative for chest pain and sob on exertion.   Eyes:  Negative for eye discharge and eye itching.   Respiratory:  Negative for cough and shortness of breath.    Gastrointestinal:  Negative for abdominal pain, nausea, vomiting, constipation and diarrhea.   Musculoskeletal:  Negative for muscle cramps and muscle ache.   Skin:  Negative for rash.   Neurological:  Positive for headaches. Negative for dizziness and altered mental status.   Hematologic/Lymphatic: Negative for swollen lymph nodes.   Psychiatric/Behavioral:  Negative for altered mental " "status.       Past Medical History:   Diagnosis Date    Asthma     Diabetes mellitus, type 2        Past Surgical History:   Procedure Laterality Date    gynecomastia surgery      right knee surgery         Family History   Problem Relation Age of Onset    Cancer Mother         Breast Cancer    Cancer Father         Stage 4 lung cancer and throat cancer    Glaucoma Maternal Grandfather        Social History     Socioeconomic History    Marital status: Single   Tobacco Use    Smoking status: Never    Smokeless tobacco: Never   Substance and Sexual Activity    Alcohol use: Yes     Comment: Social    Drug use: Never       Current Outpatient Medications   Medication Sig Dispense Refill    atorvastatin (LIPITOR) 10 MG tablet Take 1 tablet (10 mg total) by mouth once daily. 90 tablet 3    blood sugar diagnostic (ONETOUCH VERIO TEST STRIPS) Strp 1 each by Misc.(Non-Drug; Combo Route) route 2 (two) times daily before meals. 200 each 1    blood-glucose meter kit Checks blood sugars 2x/daily. 1 each 0    blood-glucose meter kit Use as instructed 1 each 0    blood-glucose sensor (DEXCOM G7 SENSOR) Jazmin 1 each by Misc.(Non-Drug; Combo Route) route every 10 days. 3 each 11    insulin aspart U-100 (NOVOLOG FLEXPEN U-100 INSULIN) 100 unit/mL (3 mL) InPn pen Inject 6 Units into the skin before meals as needed (if bg > 150 - give 6 units, if bg> 201 - give 7 untis, if bg> 251 - give 8 units, if bg > 301 - give 9 units, if bg > 351 - give 10 units.). 15 mL 3    insulin detemir U-100, Levemir, (LEVEMIR FLEXPEN) 100 unit/mL (3 mL) InPn pen Inject 30 Units into the skin every evening. 15 mL 3    lancets (LANCETS,THIN) Misc 1 each by Misc.(Non-Drug; Combo Route) route 2 (two) times daily before meals. 200 each 3    pen needle, diabetic 32 gauge x 5/32" Ndle Inject 1 each into the skin 4 (four) times daily. 200 each 3    tirzepatide (MOUNJARO) 2.5 mg/0.5 mL PnIj Inject 2.5 mg into the skin every 7 days. 4 pen 0    amoxicillin-clavulanate " 875-125mg (AUGMENTIN) 875-125 mg per tablet Take 1 tablet by mouth every 12 (twelve) hours. for 10 days 20 tablet 0    loratadine-pseudoephedrine 5-120 mg (CLARITIN-D 12 HOUR) 5-120 mg per tablet Take 1 tablet by mouth 2 (two) times daily. for 10 days 20 tablet 0     No current facility-administered medications for this visit.       Review of patient's allergies indicates:  No Known Allergies    Objective:     Physical Exam   Constitutional: He is oriented to person, place, and time. He appears well-developed. He is cooperative.  Non-toxic appearance. He does not appear ill. No distress.   HENT:   Head: Normocephalic and atraumatic.   Ears:   Right Ear: Hearing, tympanic membrane, external ear and ear canal normal. impacted cerumen  Left Ear: Hearing, tympanic membrane, external ear and ear canal normal. impacted cerumen  Nose: No mucosal edema, rhinorrhea, nasal deformity or congestion. No epistaxis. Right sinus exhibits no maxillary sinus tenderness and no frontal sinus tenderness. Left sinus exhibits maxillary sinus tenderness. Left sinus exhibits no frontal sinus tenderness.   Mouth/Throat: Uvula is midline, oropharynx is clear and moist and mucous membranes are normal. No trismus in the jaw. Normal dentition. No uvula swelling. No oropharyngeal exudate, posterior oropharyngeal edema or posterior oropharyngeal erythema.   Eyes: Conjunctivae and lids are normal. Right eye exhibits no discharge. Left eye exhibits no discharge. No scleral icterus.   Neck: Trachea normal and phonation normal. Neck supple. No edema present. No erythema present. No neck rigidity present.   Cardiovascular: Normal rate, regular rhythm, normal heart sounds and normal pulses.   No murmur heard.Exam reveals no gallop and no friction rub.   Pulmonary/Chest: Effort normal and breath sounds normal. No stridor. No respiratory distress. He has no decreased breath sounds. He has no wheezes. He has no rhonchi. He has no rales.   Abdominal: Normal  appearance.   Musculoskeletal:      Cervical back: He exhibits no tenderness.   Lymphadenopathy:     He has no cervical adenopathy.   Neurological: He is alert and oriented to person, place, and time. He displays no weakness. He exhibits normal muscle tone. Coordination normal.   Skin: Skin is warm, dry, intact, not diaphoretic, not pale and no rash.   Psychiatric: His speech is normal and behavior is normal. Mood, judgment and thought content normal.   Nursing note and vitals reviewed.      Assessment:     1. Acute non-recurrent maxillary sinusitis        Plan:       Acute non-recurrent maxillary sinusitis  -     amoxicillin-clavulanate 875-125mg (AUGMENTIN) 875-125 mg per tablet; Take 1 tablet by mouth every 12 (twelve) hours. for 10 days  Dispense: 20 tablet; Refill: 0  -     loratadine-pseudoephedrine 5-120 mg (CLARITIN-D 12 HOUR) 5-120 mg per tablet; Take 1 tablet by mouth 2 (two) times daily. for 10 days  Dispense: 20 tablet; Refill: 0    You received a steroid shot today - this can elevate your blood pressure, elevate your blood sugar, water weight gain, nervous energy, redness to the face and dimpling of the skin where the shot goes in.  Patient Instructions                                                             Sinusitis     If your condition worsens or fails to improve we recommend that you receive another evaluation at the urgent care/ER immediately or contact your PCP to discuss your concerns. You must understand that you've received an urgent care treatment only and that you may be released before all your medical problems are known or treated. You the patient will arrange for followup care as instructed.   Take antibiotics with food and as directed.   If you are female and on BCP and do take the antibiotics, use additional methods to prevent pregnancy while on the antibiotics and for one cycle after.   Flonase (fluticasone) is a nasal spray which is available over the counter and may help with  "your symptoms   Zyrtec D, Claritin D or allegra D can also help with symptoms of congestion and drainage.   If you have hypertension avoid using the "D" which is the decongestant   If you just have drainage you can take plain zyrtec, claritin or allegra     Rest and fluids are also important.     Tylenol or ibuprofen can also be used as directed for pain unless you have an allergy to them or medical condition such as stomach ulcers, kidney or liver disease or blood thinners etc for which you should not be taking these type of medications.                         "

## 2023-12-02 NOTE — PATIENT INSTRUCTIONS
"                                                          Sinusitis     If your condition worsens or fails to improve we recommend that you receive another evaluation at the urgent care/ER immediately or contact your PCP to discuss your concerns. You must understand that you've received an urgent care treatment only and that you may be released before all your medical problems are known or treated. You the patient will arrange for followup care as instructed.   Take antibiotics with food and as directed.   If you are female and on BCP and do take the antibiotics, use additional methods to prevent pregnancy while on the antibiotics and for one cycle after.   Flonase (fluticasone) is a nasal spray which is available over the counter and may help with your symptoms   Zyrtec D, Claritin D or allegra D can also help with symptoms of congestion and drainage.   If you have hypertension avoid using the "D" which is the decongestant   If you just have drainage you can take plain zyrtec, claritin or allegra     Rest and fluids are also important.     Tylenol or ibuprofen can also be used as directed for pain unless you have an allergy to them or medical condition such as stomach ulcers, kidney or liver disease or blood thinners etc for which you should not be taking these type of medications.         "

## 2023-12-05 ENCOUNTER — LAB VISIT (OUTPATIENT)
Dept: LAB | Facility: HOSPITAL | Age: 33
End: 2023-12-05
Attending: FAMILY MEDICINE
Payer: COMMERCIAL

## 2023-12-05 DIAGNOSIS — E11.69 HYPERLIPIDEMIA ASSOCIATED WITH TYPE 2 DIABETES MELLITUS: ICD-10-CM

## 2023-12-05 DIAGNOSIS — E11.9 TYPE 2 DIABETES MELLITUS WITHOUT COMPLICATION, WITHOUT LONG-TERM CURRENT USE OF INSULIN: ICD-10-CM

## 2023-12-05 DIAGNOSIS — E78.5 HYPERLIPIDEMIA ASSOCIATED WITH TYPE 2 DIABETES MELLITUS: ICD-10-CM

## 2023-12-05 LAB
ALBUMIN SERPL BCP-MCNC: 4.2 G/DL (ref 3.5–5.2)
ALP SERPL-CCNC: 96 U/L (ref 55–135)
ALT SERPL W/O P-5'-P-CCNC: 14 U/L (ref 10–44)
ANION GAP SERPL CALC-SCNC: 10 MMOL/L (ref 8–16)
AST SERPL-CCNC: 15 U/L (ref 10–40)
BILIRUB SERPL-MCNC: 0.7 MG/DL (ref 0.1–1)
BUN SERPL-MCNC: 11 MG/DL (ref 6–20)
CALCIUM SERPL-MCNC: 10 MG/DL (ref 8.7–10.5)
CHLORIDE SERPL-SCNC: 105 MMOL/L (ref 95–110)
CHOLEST SERPL-MCNC: 82 MG/DL (ref 120–199)
CHOLEST/HDLC SERPL: 2.8 {RATIO} (ref 2–5)
CO2 SERPL-SCNC: 28 MMOL/L (ref 23–29)
CREAT SERPL-MCNC: 0.8 MG/DL (ref 0.5–1.4)
EST. GFR  (NO RACE VARIABLE): >60 ML/MIN/1.73 M^2
ESTIMATED AVG GLUCOSE: 163 MG/DL (ref 68–131)
GLUCOSE SERPL-MCNC: 111 MG/DL (ref 70–110)
HBA1C MFR BLD: 7.3 % (ref 4–5.6)
HDLC SERPL-MCNC: 29 MG/DL (ref 40–75)
HDLC SERPL: 35.4 % (ref 20–50)
LDLC SERPL CALC-MCNC: 41.6 MG/DL (ref 63–159)
NONHDLC SERPL-MCNC: 53 MG/DL
POTASSIUM SERPL-SCNC: 4.3 MMOL/L (ref 3.5–5.1)
PROT SERPL-MCNC: 7.6 G/DL (ref 6–8.4)
SODIUM SERPL-SCNC: 143 MMOL/L (ref 136–145)
TRIGL SERPL-MCNC: 57 MG/DL (ref 30–150)

## 2023-12-05 PROCEDURE — 36415 COLL VENOUS BLD VENIPUNCTURE: CPT | Mod: PO | Performed by: FAMILY MEDICINE

## 2023-12-05 PROCEDURE — 80061 LIPID PANEL: CPT | Performed by: FAMILY MEDICINE

## 2023-12-05 PROCEDURE — 80053 COMPREHEN METABOLIC PANEL: CPT | Performed by: FAMILY MEDICINE

## 2023-12-05 PROCEDURE — 83036 HEMOGLOBIN GLYCOSYLATED A1C: CPT | Performed by: FAMILY MEDICINE

## 2023-12-12 ENCOUNTER — OFFICE VISIT (OUTPATIENT)
Dept: INTERNAL MEDICINE | Facility: CLINIC | Age: 33
End: 2023-12-12
Payer: COMMERCIAL

## 2023-12-12 VITALS
BODY MASS INDEX: 26.17 KG/M2 | RESPIRATION RATE: 19 BRPM | OXYGEN SATURATION: 98 % | TEMPERATURE: 97 F | DIASTOLIC BLOOD PRESSURE: 72 MMHG | SYSTOLIC BLOOD PRESSURE: 124 MMHG | WEIGHT: 186.94 LBS | HEART RATE: 95 BPM | HEIGHT: 71 IN

## 2023-12-12 DIAGNOSIS — Z79.4 TYPE 2 DIABETES MELLITUS WITHOUT COMPLICATION, WITH LONG-TERM CURRENT USE OF INSULIN: Primary | ICD-10-CM

## 2023-12-12 DIAGNOSIS — E11.9 TYPE 2 DIABETES MELLITUS WITHOUT COMPLICATION, WITH LONG-TERM CURRENT USE OF INSULIN: Primary | ICD-10-CM

## 2023-12-12 PROCEDURE — 3061F PR NEG MICROALBUMINURIA RESULT DOCUMENTED/REVIEW: ICD-10-PCS | Mod: CPTII,S$GLB,, | Performed by: FAMILY MEDICINE

## 2023-12-12 PROCEDURE — 3078F PR MOST RECENT DIASTOLIC BLOOD PRESSURE < 80 MM HG: ICD-10-PCS | Mod: CPTII,S$GLB,, | Performed by: FAMILY MEDICINE

## 2023-12-12 PROCEDURE — 3051F PR MOST RECENT HEMOGLOBIN A1C LEVEL 7.0 - < 8.0%: ICD-10-PCS | Mod: CPTII,S$GLB,, | Performed by: FAMILY MEDICINE

## 2023-12-12 PROCEDURE — 3074F PR MOST RECENT SYSTOLIC BLOOD PRESSURE < 130 MM HG: ICD-10-PCS | Mod: CPTII,S$GLB,, | Performed by: FAMILY MEDICINE

## 2023-12-12 PROCEDURE — 3008F BODY MASS INDEX DOCD: CPT | Mod: CPTII,S$GLB,, | Performed by: FAMILY MEDICINE

## 2023-12-12 PROCEDURE — 1159F PR MEDICATION LIST DOCUMENTED IN MEDICAL RECORD: ICD-10-PCS | Mod: CPTII,S$GLB,, | Performed by: FAMILY MEDICINE

## 2023-12-12 PROCEDURE — 1160F RVW MEDS BY RX/DR IN RCRD: CPT | Mod: CPTII,S$GLB,, | Performed by: FAMILY MEDICINE

## 2023-12-12 PROCEDURE — 3066F PR DOCUMENTATION OF TREATMENT FOR NEPHROPATHY: ICD-10-PCS | Mod: CPTII,S$GLB,, | Performed by: FAMILY MEDICINE

## 2023-12-12 PROCEDURE — 3061F NEG MICROALBUMINURIA REV: CPT | Mod: CPTII,S$GLB,, | Performed by: FAMILY MEDICINE

## 2023-12-12 PROCEDURE — 99999 PR PBB SHADOW E&M-EST. PATIENT-LVL V: ICD-10-PCS | Mod: PBBFAC,,, | Performed by: FAMILY MEDICINE

## 2023-12-12 PROCEDURE — 3051F HG A1C>EQUAL 7.0%<8.0%: CPT | Mod: CPTII,S$GLB,, | Performed by: FAMILY MEDICINE

## 2023-12-12 PROCEDURE — 3078F DIAST BP <80 MM HG: CPT | Mod: CPTII,S$GLB,, | Performed by: FAMILY MEDICINE

## 2023-12-12 PROCEDURE — 3066F NEPHROPATHY DOC TX: CPT | Mod: CPTII,S$GLB,, | Performed by: FAMILY MEDICINE

## 2023-12-12 PROCEDURE — 3074F SYST BP LT 130 MM HG: CPT | Mod: CPTII,S$GLB,, | Performed by: FAMILY MEDICINE

## 2023-12-12 PROCEDURE — 3008F PR BODY MASS INDEX (BMI) DOCUMENTED: ICD-10-PCS | Mod: CPTII,S$GLB,, | Performed by: FAMILY MEDICINE

## 2023-12-12 PROCEDURE — 99999 PR PBB SHADOW E&M-EST. PATIENT-LVL V: CPT | Mod: PBBFAC,,, | Performed by: FAMILY MEDICINE

## 2023-12-12 PROCEDURE — 1159F MED LIST DOCD IN RCRD: CPT | Mod: CPTII,S$GLB,, | Performed by: FAMILY MEDICINE

## 2023-12-12 PROCEDURE — 99213 PR OFFICE/OUTPT VISIT, EST, LEVL III, 20-29 MIN: ICD-10-PCS | Mod: S$GLB,,, | Performed by: FAMILY MEDICINE

## 2023-12-12 PROCEDURE — 99213 OFFICE O/P EST LOW 20 MIN: CPT | Mod: S$GLB,,, | Performed by: FAMILY MEDICINE

## 2023-12-12 PROCEDURE — 1160F PR REVIEW ALL MEDS BY PRESCRIBER/CLIN PHARMACIST DOCUMENTED: ICD-10-PCS | Mod: CPTII,S$GLB,, | Performed by: FAMILY MEDICINE

## 2023-12-12 RX ORDER — TIRZEPATIDE 5 MG/.5ML
5 INJECTION, SOLUTION SUBCUTANEOUS
COMMUNITY

## 2023-12-12 NOTE — PROGRESS NOTES
Subjective     Patient ID: Nader Cardona is a 33 y.o. male.    Chief Complaint: 3m f/u    HPI 33-year-old white male presents to clinic today for diabetes follow-up.  He has been seeing Batsheva Muñoz NP for treatment of diabetes.  His last hemoglobin A1c in September was 10.5.  Current hemoglobin A1c is 7.3.  Workup was performed and the patient is noted to have a low C-peptide; therefore, treatment has been targeted towards type 1 diabetes.  He has been started on Levemir and has been taking 30 units nightly along with NovoLog 6 units with meals plus sliding scale.  He has also been started on Mounjaro and is currently on 5 mg weekly.  He initially noted diarrhea but reports that this has since subsided.  He continues to use a Dexcom 7 for monitoring.  He has been in goal range 66% of the time over the past 90 days with less than 1% lows.  Review of Systems   Constitutional:  Negative for appetite change, chills, fatigue and fever.   HENT:  Negative for nasal congestion, ear pain, hearing loss, postnasal drip, rhinorrhea, sinus pressure/congestion, sore throat and tinnitus.    Eyes:  Negative for redness, itching and visual disturbance.   Respiratory:  Negative for cough, chest tightness and shortness of breath.    Cardiovascular:  Negative for chest pain and palpitations.   Gastrointestinal:  Negative for abdominal pain, constipation, diarrhea, nausea and vomiting.   Genitourinary:  Negative for decreased urine volume, difficulty urinating, dysuria, frequency, hematuria and urgency.   Musculoskeletal:  Negative for back pain, myalgias, neck pain and neck stiffness.   Integumentary:  Negative for rash.   Neurological:  Negative for dizziness, light-headedness and headaches.   Psychiatric/Behavioral: Negative.            Objective     Physical Exam  Vitals and nursing note reviewed.   Constitutional:       General: He is not in acute distress.     Appearance: Normal appearance. He is well-developed. He is not  diaphoretic.   HENT:      Head: Normocephalic and atraumatic.      Right Ear: External ear normal.      Left Ear: External ear normal.      Nose: Nose normal.      Mouth/Throat:      Pharynx: No oropharyngeal exudate.   Eyes:      General: No scleral icterus.        Right eye: No discharge.         Left eye: No discharge.      Conjunctiva/sclera: Conjunctivae normal.      Pupils: Pupils are equal, round, and reactive to light.   Neck:      Thyroid: No thyromegaly.      Vascular: No JVD.      Trachea: No tracheal deviation.   Cardiovascular:      Rate and Rhythm: Normal rate and regular rhythm.      Heart sounds: Normal heart sounds. No murmur heard.     No friction rub. No gallop.   Pulmonary:      Effort: Pulmonary effort is normal. No respiratory distress.      Breath sounds: Normal breath sounds. No stridor. No wheezing, rhonchi or rales.   Chest:      Chest wall: No tenderness.   Abdominal:      General: Bowel sounds are normal. There is no distension.      Palpations: Abdomen is soft. There is no mass.      Tenderness: There is no abdominal tenderness. There is no guarding or rebound.   Musculoskeletal:         General: No tenderness. Normal range of motion.      Cervical back: Normal range of motion and neck supple.   Lymphadenopathy:      Cervical: No cervical adenopathy.   Skin:     General: Skin is warm and dry.      Coloration: Skin is not pale.      Findings: No erythema or rash.   Neurological:      Mental Status: He is alert and oriented to person, place, and time.   Psychiatric:         Mood and Affect: Mood normal.         Behavior: Behavior normal.         Thought Content: Thought content normal.         Judgment: Judgment normal.            Assessment and Plan     1. Type 2 diabetes mellitus without complication, with long-term current use of insulin        1. Continue Levemir, NovoLog, and Mounjaro as prescribed and continue follow-up with Batsheva Muñoz NP as scheduled.    2. Return to clinic as  needed or in 9 months for annual physical exam.               Follow up in about 9 months (around 9/12/2024), or if symptoms worsen or fail to improve, for Annual exam.       Detail Level: Detailed Detail Level: Zone Detail Level: Generalized

## 2023-12-21 ENCOUNTER — OFFICE VISIT (OUTPATIENT)
Dept: INTERNAL MEDICINE | Facility: CLINIC | Age: 33
End: 2023-12-21
Payer: COMMERCIAL

## 2023-12-21 VITALS
DIASTOLIC BLOOD PRESSURE: 74 MMHG | BODY MASS INDEX: 26.23 KG/M2 | TEMPERATURE: 99 F | RESPIRATION RATE: 16 BRPM | OXYGEN SATURATION: 99 % | WEIGHT: 187.38 LBS | HEIGHT: 71 IN | HEART RATE: 102 BPM | SYSTOLIC BLOOD PRESSURE: 126 MMHG

## 2023-12-21 DIAGNOSIS — R79.89 LOW SERUM C-PEPTIDE: ICD-10-CM

## 2023-12-21 DIAGNOSIS — N52.9 ERECTILE DYSFUNCTION, UNSPECIFIED ERECTILE DYSFUNCTION TYPE: ICD-10-CM

## 2023-12-21 DIAGNOSIS — E11.9 TYPE 2 DIABETES MELLITUS WITHOUT COMPLICATION, WITHOUT LONG-TERM CURRENT USE OF INSULIN: Primary | ICD-10-CM

## 2023-12-21 DIAGNOSIS — Z79.4 TYPE 2 DIABETES MELLITUS WITHOUT COMPLICATION, WITH LONG-TERM CURRENT USE OF INSULIN: ICD-10-CM

## 2023-12-21 DIAGNOSIS — E11.69 HYPERLIPIDEMIA ASSOCIATED WITH TYPE 2 DIABETES MELLITUS: ICD-10-CM

## 2023-12-21 DIAGNOSIS — E11.9 TYPE 2 DIABETES MELLITUS WITHOUT COMPLICATION, WITH LONG-TERM CURRENT USE OF INSULIN: ICD-10-CM

## 2023-12-21 DIAGNOSIS — E78.5 HYPERLIPIDEMIA ASSOCIATED WITH TYPE 2 DIABETES MELLITUS: ICD-10-CM

## 2023-12-21 PROCEDURE — 3066F PR DOCUMENTATION OF TREATMENT FOR NEPHROPATHY: ICD-10-PCS | Mod: CPTII,S$GLB,, | Performed by: NURSE PRACTITIONER

## 2023-12-21 PROCEDURE — 3051F HG A1C>EQUAL 7.0%<8.0%: CPT | Mod: CPTII,S$GLB,, | Performed by: NURSE PRACTITIONER

## 2023-12-21 PROCEDURE — 3008F BODY MASS INDEX DOCD: CPT | Mod: CPTII,S$GLB,, | Performed by: NURSE PRACTITIONER

## 2023-12-21 PROCEDURE — 1160F RVW MEDS BY RX/DR IN RCRD: CPT | Mod: CPTII,S$GLB,, | Performed by: NURSE PRACTITIONER

## 2023-12-21 PROCEDURE — 3061F NEG MICROALBUMINURIA REV: CPT | Mod: CPTII,S$GLB,, | Performed by: NURSE PRACTITIONER

## 2023-12-21 PROCEDURE — 3051F PR MOST RECENT HEMOGLOBIN A1C LEVEL 7.0 - < 8.0%: ICD-10-PCS | Mod: CPTII,S$GLB,, | Performed by: NURSE PRACTITIONER

## 2023-12-21 PROCEDURE — 99215 PR OFFICE/OUTPT VISIT, EST, LEVL V, 40-54 MIN: ICD-10-PCS | Mod: S$GLB,,, | Performed by: NURSE PRACTITIONER

## 2023-12-21 PROCEDURE — 3078F DIAST BP <80 MM HG: CPT | Mod: CPTII,S$GLB,, | Performed by: NURSE PRACTITIONER

## 2023-12-21 PROCEDURE — 3074F PR MOST RECENT SYSTOLIC BLOOD PRESSURE < 130 MM HG: ICD-10-PCS | Mod: CPTII,S$GLB,, | Performed by: NURSE PRACTITIONER

## 2023-12-21 PROCEDURE — 99999 PR PBB SHADOW E&M-EST. PATIENT-LVL V: ICD-10-PCS | Mod: PBBFAC,,, | Performed by: NURSE PRACTITIONER

## 2023-12-21 PROCEDURE — 1159F PR MEDICATION LIST DOCUMENTED IN MEDICAL RECORD: ICD-10-PCS | Mod: CPTII,S$GLB,, | Performed by: NURSE PRACTITIONER

## 2023-12-21 PROCEDURE — 99215 OFFICE O/P EST HI 40 MIN: CPT | Mod: S$GLB,,, | Performed by: NURSE PRACTITIONER

## 2023-12-21 PROCEDURE — 3066F NEPHROPATHY DOC TX: CPT | Mod: CPTII,S$GLB,, | Performed by: NURSE PRACTITIONER

## 2023-12-21 PROCEDURE — 95251 CONT GLUC MNTR ANALYSIS I&R: CPT | Mod: S$GLB,,, | Performed by: NURSE PRACTITIONER

## 2023-12-21 PROCEDURE — 99999 PR PBB SHADOW E&M-EST. PATIENT-LVL V: CPT | Mod: PBBFAC,,, | Performed by: NURSE PRACTITIONER

## 2023-12-21 PROCEDURE — 1159F MED LIST DOCD IN RCRD: CPT | Mod: CPTII,S$GLB,, | Performed by: NURSE PRACTITIONER

## 2023-12-21 PROCEDURE — 3074F SYST BP LT 130 MM HG: CPT | Mod: CPTII,S$GLB,, | Performed by: NURSE PRACTITIONER

## 2023-12-21 PROCEDURE — 3061F PR NEG MICROALBUMINURIA RESULT DOCUMENTED/REVIEW: ICD-10-PCS | Mod: CPTII,S$GLB,, | Performed by: NURSE PRACTITIONER

## 2023-12-21 PROCEDURE — 3008F PR BODY MASS INDEX (BMI) DOCUMENTED: ICD-10-PCS | Mod: CPTII,S$GLB,, | Performed by: NURSE PRACTITIONER

## 2023-12-21 PROCEDURE — 3078F PR MOST RECENT DIASTOLIC BLOOD PRESSURE < 80 MM HG: ICD-10-PCS | Mod: CPTII,S$GLB,, | Performed by: NURSE PRACTITIONER

## 2023-12-21 PROCEDURE — 95251 PR GLUCOSE MONITOR, 72 HOUR, PHYS INTERP: ICD-10-PCS | Mod: S$GLB,,, | Performed by: NURSE PRACTITIONER

## 2023-12-21 PROCEDURE — 1160F PR REVIEW ALL MEDS BY PRESCRIBER/CLIN PHARMACIST DOCUMENTED: ICD-10-PCS | Mod: CPTII,S$GLB,, | Performed by: NURSE PRACTITIONER

## 2023-12-21 RX ORDER — TADALAFIL 5 MG/1
5 TABLET ORAL DAILY PRN
Qty: 30 TABLET | Refills: 1 | Status: SHIPPED | OUTPATIENT
Start: 2023-12-21 | End: 2024-01-04

## 2023-12-21 RX ORDER — INSULIN ASPART 100 [IU]/ML
4 INJECTION, SOLUTION INTRAVENOUS; SUBCUTANEOUS
Qty: 15 ML | Refills: 3 | Status: SHIPPED | OUTPATIENT
Start: 2023-12-21 | End: 2024-01-11 | Stop reason: SDUPTHER

## 2023-12-21 NOTE — PROGRESS NOTES
33 y.o. male here for routine follow up for management of Type 1.5 diabetes -   Insulin dependent - with low cpeptide, and mildly elevated RIMA antibodies.     A1c much improved from 10.5% to 7.3% -   Started him on mounjaro last visit - he is taking weekly -   Also restart him on meal time/short acting insulin.   He is continuing levemir every night - increased him from 25 to 30 units every night.     He admits, only eating 1 meal per day - usually lunch or dinner - never breakfast.   Is scared/nervous to eat - b/c doesn't want his blood sugars to go up.   Occasional protein bar.   He has given up fast food since last visit - so this was a big change     Feels very tired often - energy levels are still down.   ED is still an issue. He is taking off brand viagra from a website - it is not working really.   Has lost a few pounds more since last visit.   Current weight is 187 lbs.     Dexcom g7 downloaded and reviewed today -   See scanned in .   Average bg is 150  Estimated A1c 6.9% -  Time in range 78%   Highs 17%   Severe highs 3%   Low 1%   Severe lows 1%     In the past 90 days - his summary shows onyly 28% tir and 50% extreme highs - see below.   He is using clarity on his dexcom guadalupe and utilizing dexcom follow for alerts with his room mate.           Last visit notes from 11/2023-   33 y.o male here for routine follow up for management of diabetes -   Last seen 9/26/23 - those notes are below.     Last a1c was at 10.5% in September 2023 -   no new labs for liliamady's visit -   However his cgm estimates him at 11% -   Paternal aunt has T1 diabetes only.  Grandfather also has Diabetes - unknown what type.     Patient is working on diet - has limited a lot of sweets and switched to sugar free drinks.   Weight has gone down from 194 to 191 lbs today's visit.   He is now on insulin - taking levemir 25 units every night. ( I had begun him at 15 units every night, and he has slowly needed to increase dose up)      Feels like this is not working. His blood sugars remain high.   Has met with dietician.   He states he feels very very tired. Feels like falling asleep often.   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   He has a 3 year old son also - partial custody with ex.   Also has a room mate.   Dad lives 1/2 mile away, so states he has a good support system.     In the interim I did do blood work to [facility] for type 1 diabetes -   Cpeptide came back present but on lower end.   Weight based needs for insulin based on 191 lbs - is 43 units of insulin total per day -     C-Peptide 0.78 - 5.19 ng/mL 0.77 Low   1.07      Component Ref Range & Units 1 mo ago 2 yr ago   Glutamic Acid Decarb Ab <=0.02 nmol/L 0.03 High   0.01 CM      Islet Cell Ab <1:4 <1:4  <1:4 CM      Is wearing new personal Dexcom g7 - gets supplies from pharmacy.   downloaded and reviewed today -   See scanned in .   Average glucose is 320 -   Gmi is estimated at 11% -   1% time in range.   10% highs.   89% very highs.       Last visit notes as follows from September 2023 -   33 y.o. male here for 2 year follow up for management of T2dm -   Last seen aguust 2021 - those notes below.     Hgba1c is still elevated, from 9.8% ---> 10.5%   Recently stopped drinking alcohol - was drinking socially on weekends - 15 beers over 2 days time.   Is trying to make some diet changes - cutting out chips -   Is feeling bad -- having diarrhea and ED, drinking a lot of water, feels thirsty.   Urination more.     He had been taking Xigduo XR 5/1000 mg tablet daily in morning - it seemed to   Not checking blood sugars   Does not have a glucometer.   Diet- fast food, processed foods, and changed diet a couple weeks when he saw his recent labs.   Eating lean meats, veggies.   Some fried food, and potatoes.   Reports was closer to 250 lbs and is now around 194 lbs.     Labs done to r/o type 1 in 2021 were negative, has not had repeat  "work up.   Glucose today - 3 hours post prandial - fasting at level of 323.   Had a burrito for lunch with chips.   Urine dip done -   Clean voided - yellow -   Clear -   1005   7   Negative -   Ph 7 -   Leukocytes +  Nitrites -   Protein +   1000+   Moderate amount of ketones  Urobili+  Negative for bilirubin   Negative blood             Last visit notes as follows from 8/5/2021 -   30 y.o. male, here for follow up visit for management of type 2 diabetes.   Today is his 6 week follow up -     Was initially on metformin and then I switched him to xigduo 5/1000mg   He is not taking daily - admits has only taken a total of 7 to 10 tablets possibly.   cpeptide was detected on labwork - so treating as T2dm.    Met with PAT Early for DE on 7/16/2021 -   Today, he ate breakfast - a bag of cookies out of vending machine at 4 am (he has to get to work early).   His sugar today - 5 hours later is 220. We checked here in the office.   While his sugars are decreasing, he is not near goal.   He was checking his sugars almost daily in the morning time, however his lancet device broke - and he needs a new one.   Showed me his logs below:   States when he is "in the green" - he is on the xigduo -   The red glucose levels below are when he is not on the medication -   He is remaining active, he has a 7 month old son - so is busy many weekends taking care of him.             Last visit notes from June 2021 as follows:   HPI: Nader Cardona is a 33 y.o.  male c/I for visit to address Diabetes Type 2  This is the first time I am seeing them for care, follows with Ab Man MD for primary care needs.   Has not seen endocrinology or diabetes specialist in the past.     was diagnosed with T2DM about 2 years ago -   Paternal aunt has T1 diabetes only.  Grandfather also has Diabetes - unknown what type.   Has never been hospitalized r/t DM.    a1c is elevated @ 9.8%.   He just began on metformin 500mg tablets - 2 per " day. Having diarrhea - right after taking it.   But not an issue while at work.   He's been on metformin for 1 month now.   Admits eats lots of fast food/lives by himself. Lots of high carb snacks.   Smokes cigar on occasion -   Drinks ETOH socially on weekend - beer mostly, vodka/soda water.   Doesn't check blood sugars - knows how.   His fasting bg today in clinic is 255   Feeling down as his dad just passed away last month of lung cancer, also has new 6 month old son/baby.   He denies depression however. We discussed medication for now or referral to counselor, but he politely declined during the visit.     Complications from diabetes include:   Negative for DKA.   Has never taken insulin in life.   -negative for diabetic neuropathy.   -negative for nephropathy.   No microalbuminuria test done.   Eyes - negative for Diabetic retinopathy - cyst in right eye - outside facility in [facility].   Denies CV disease.   Denies known CAD.   HTN - none  HLD - controlled - just begun on statin.     Past medical History:   Past Medical History:   Diagnosis Date    Asthma     Diabetes mellitus, type 2       Family hx:   Family History   Problem Relation Age of Onset    Cancer Mother         Breast Cancer    Cancer Father         Stage 4 lung cancer and throat cancer    Glaucoma Maternal Grandfather       Current meds:   Current Outpatient Medications:     atorvastatin (LIPITOR) 10 MG tablet, Take 1 tablet (10 mg total) by mouth once daily., Disp: 90 tablet, Rfl: 3    blood sugar diagnostic (ONETOUCH VERIO TEST STRIPS) Strp, 1 each by Misc.(Non-Drug; Combo Route) route 2 (two) times daily before meals., Disp: 200 each, Rfl: 1    blood-glucose meter kit, Checks blood sugars 2x/daily., Disp: 1 each, Rfl: 0    blood-glucose meter kit, Use as instructed, Disp: 1 each, Rfl: 0    blood-glucose sensor (DEXCOM G7 SENSOR) Jazmin, 1 each by Misc.(Non-Drug; Combo Route) route every 10 days., Disp: 3 each, Rfl: 11    insulin aspart  "U-100 (NOVOLOG FLEXPEN U-100 INSULIN) 100 unit/mL (3 mL) InPn pen, Inject 4 Units into the skin before meals as needed (If glucose is 120 or higher going into the meal, give 4 units, if 160 >, give 5 units.  If 200 >, give 6 units.  240 or higher >, give 7 units, If bg is 280 or >, give 8 units,  320 or >,  give 9 units). Max TDD is 30 units per day., Disp: 15 mL, Rfl: 3    insulin detemir U-100, Levemir, (LEVEMIR FLEXPEN) 100 unit/mL (3 mL) InPn pen, Inject 30 Units into the skin every evening., Disp: 15 mL, Rfl: 3    lancets (LANCETS,THIN) Misc, 1 each by Misc.(Non-Drug; Combo Route) route 2 (two) times daily before meals., Disp: 200 each, Rfl: 3    pen needle, diabetic 32 gauge x 5/32" Ndle, Inject 1 each into the skin 4 (four) times daily., Disp: 200 each, Rfl: 3    tadalafiL (CIALIS) 5 MG tablet, Take 1 tablet (5 mg total) by mouth daily as needed for Erectile Dysfunction (take before intercourse. works for 24 - up to 72 hours.)., Disp: 30 tablet, Rfl: 1    tirzepatide (MOUNJARO) 5 mg/0.5 mL PnIj, Inject 5 mg into the skin every 7 days., Disp: , Rfl:      Current Diabetes medications:   Levemir 30 units daily -   Novolog 6 - 8 units tid ac meals.   Mounjaro 5mg every week -     Medications Tried and Failed:   Metformin 500 mg - 2 tablets per day.   xigduo XR 5/1000mg tablet daily in morning.     Review of Pertinent co-morbidities/risk factors:   CV: Denies history of MI nor stroke.   CAD: Denies.  Takes aspirin 81mg tablet daily  BP: has history of HTN  Statin: Taking  ACE/ARB: Not taking    Social History     Tobacco Use   Smoking Status Never   Smokeless Tobacco Never      Social:   Lives at home by himself. Has a 6 month old son - is not in relationship with the mother  Life changes/stressors currently: Father  last month of Stage 4 lung cancer - so very stressed about that.   Also has 3 year old son.   Diet: not following ADA diet - but has cut down a lot on food.   Meals: 2 - 3 per day and snacks       " "Breakfast - none.        Lunch - fried chicken or hamburger/fries/diet coke or coke zero - used to drink sprite       Dinner - same - fast food - tacos, fried chicken, burgers, chinese food.        Snacks - cheeze its, pringles, little lula chocolate swiss cake rolls. Cookies        Goes to vending machine to get food often because little time.         Drinks- sprite, diet coke   Exercise: none.   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     Glucose Monitoring:   Is checking glucose with new personal dexcom g7 -     Standards of care:   Eyes: .: 11/21/2023  Foot exam: : 06/25/2021   Diabetes education: yes July 2021 -    Vital Signs  /74 (BP Location: Right arm, Patient Position: Sitting, BP Method: Medium (Manual))   Pulse 102   Temp 98.8 °F (37.1 °C) (Temporal)   Resp 16   Ht 5' 11" (1.803 m)   Wt 85 kg (187 lb 6.3 oz)   SpO2 99%   BMI 26.14 kg/m²     Pertinent Labs:   Hgba1c   Lab Results   Component Value Date    HGBA1C 7.3 (H) 12/05/2023    HGBA1C 10.5 (H) 09/12/2023    HGBA1C 9.8 (H) 06/07/2021     Lipid panel   Lab Results   Component Value Date    CHOL 82 (L) 12/05/2023    CHOL 141 09/12/2023    CHOL 157 06/07/2021     Lab Results   Component Value Date    HDL 29 (L) 12/05/2023    HDL 34 (L) 09/12/2023    HDL 45 06/07/2021     Lab Results   Component Value Date    LDLCALC 41.6 (L) 12/05/2023    LDLCALC 56.2 (L) 09/12/2023    LDLCALC 70.0 06/07/2021     Lab Results   Component Value Date    TRIG 57 12/05/2023    TRIG 254 (H) 09/12/2023    TRIG 210 (H) 06/07/2021     Lab Results   Component Value Date    CHOLHDL 35.4 12/05/2023    CHOLHDL 24.1 09/12/2023    CHOLHDL 28.7 06/07/2021      CMP  Glucose   Date Value Ref Range Status   12/05/2023 111 (H) 70 - 110 mg/dL Final     BUN   Date Value Ref Range Status   12/05/2023 11 6 - 20 mg/dL Final     Creatinine   Date Value Ref Range Status "   12/05/2023 0.8 0.5 - 1.4 mg/dL Final     eGFR if    Date Value Ref Range Status   06/25/2021 >60.0 >60 mL/min/1.73 m^2 Final     eGFR if non    Date Value Ref Range Status   06/25/2021 >60.0 >60 mL/min/1.73 m^2 Final     Comment:     Calculation used to obtain the estimated glomerular filtration  rate (eGFR) is the CKD-EPI equation.        AST   Date Value Ref Range Status   12/05/2023 15 10 - 40 U/L Final     ALT   Date Value Ref Range Status   12/05/2023 14 10 - 44 U/L Final     Microalbumin creatinine ratio:   Lab Results   Component Value Date    MICALBCREAT Unable to calculate 09/12/2023       Review Of Systems:   Gen: Appetite good, + weight loss, + fatigue and weakness. No polydipsia further.   Skin: Skin is intact and heals well, denies any rashes or hair changes.   EENT: Denies any acute visual disturbances, nor blurred vision.   Resp: Denies SOB or Dyspnea on exertion, denies cough.   Cardiac: Denies chest pain, palpitations, or swelling.   GI: Denies abdominal pain, nausea or vomiting, diarrhea, or constipation.   /GYN: no more nocturia and polyuria,  no burning, urinary frequency much improved, no pain on urination. + ED  MS/Neuro: Denies numbness/ tingling in BLE; Gait steady, speech clear  Psych: Denies drug/ETOH abuse, no hx of depression.  Other systems: negative.    Physical Exam:   GENERAL: Well developed, well nourished in appearance.   PSYCH: AAOx3, appropriate mood and affect, pleasant expression, conversant, appears relaxed, well groomed.   EYES: PERRL, Conjunctiva and corneas clear  NECK: Soft and Supple, trachea midline,  CHEST: Even, regular, and unlabored respirations  ABDOMEN: Soft, non-tender, non-distended.   VASCULAR: pedal pulses palpable bilaterally, no edema.  NEURO:  cranial nerves II - XII intact   MUSCULOSKELETAL: Good ROM, steady gait.   SKIN: Skin warm, dry, and intact -     Assessment and Plan of Care:     Nader was seen today for follow-up  and diabetes.    Diagnoses and all orders for this visit:    Type 2 diabetes mellitus without complication, without long-term current use of insulin  -     insulin aspart U-100 (NOVOLOG FLEXPEN U-100 INSULIN) 100 unit/mL (3 mL) InPn pen; Inject 4 Units into the skin before meals as needed (If glucose is 120 or higher going into the meal, give 4 units, if 160 >, give 5 units.  If 200 >, give 6 units.  240 or higher >, give 7 units, If bg is 280 or >, give 8 units,  320 or >,  give 9 units). Max TDD is 30 units per day.    Low serum C-peptide    Type 2 diabetes mellitus without complication, with long-term current use of insulin    Hyperlipidemia associated with type 2 diabetes mellitus    Erectile dysfunction, unspecified erectile dysfunction type  -     tadalafiL (CIALIS) 5 MG tablet; Take 1 tablet (5 mg total) by mouth daily as needed for Erectile Dysfunction (take before intercourse. works for 24 - up to 72 hours.).  -     Ambulatory referral/consult to Urology; Future       1. T2DM with hyperglycemia- with low cpeptide - presentation - consistent with type 1 - so treating as such until seeing if glp1 works -    Hgba1c goal is 7.5% or less without hypoglycemia - 9.8% --> 10.5% ---7.3%  -current improving   discussed DM, progression of disease, long term complications, CV risk factors and tx options.   Advise compliance with ADA diet and encourage exercise  DE consulted for teaching and return demonstration -   Levemir 30 units every night - you can decrease to 25 units again.   Continue diet changes.   Continue the meal time isnulin - made adjustments on dosing.   So if glucose is 120 or higher going into the meal, 4 units.   If glucose is 160 or higher going into the meal, 5 units.   If glucose is 200 or higher going into the meal, 6 units.   If glucose is 240 or higher going into the meal, give 7 units.   If glucose is 280 or higher going into the meal, give 8 units.   If glucose is 320 or higher going into the  meal, give 9 units   Continue mounjaro 5mg weekly - we can trial 2 weeks off of it and see if this helps.   We discussed in full changing diet dramatically - he is working on this.    Instructed to monitor Blood glucose 2x/day before meals and bring meter/ log to every clinic visit. Sent in rx for meter, and supplies-   Continue on dexcom g7     2. Blood Pressure- controlled today -     3. Lipids- LDL goal < 100.  At goal. Trigs high - likely diet related.   Currently was just begun on zocor. To continue.     4. Weight - BMI Body mass index is 26.14 kg/m².   Encourage Ada diet and exercise.   Watch the weight - I might stop the mounjaro.     5. Renal Function - stable.   No history of nephropathy.   Will follow.   No urine mac, will obtain.     6. Vitamin D def - on replacement - defer to primarcy        Rtc in 3 months OV and labs.

## 2023-12-21 NOTE — PATIENT INSTRUCTIONS
For now, continue Levemir daily - you can try to titrate your long acting insulin less - possibly 25 - 28 units daily instead of the 30 units daily.     Trial 2 weeks OFF of mounjaro and see how that impacts your blood sugars.   If your glucoses are NOT impacted much off of the mounjaro, then just stay off of it.     Keep off of fast foods, try to eat 3 - 4 times - small frequent meals are best for you.   Real food, not boxed/bagged/processed foods - these will tend to increase your blood sugars higher.     Still use the novolog before meals as needed - its' important to take this short acting insulin with or right before the meal as its duration of action is 4 hours long   (Same amount of time as it takes for your food to digest) -   So if glucose is 120 or higher going into the meal, 4 units.   If glucose is 160 or higher going into the meal, 5 units.   If glucose is 200 or higher going into the meal, 6 units.   If glucose is 240 or higher going into the meal, give 7 units.   If glucose is 280 or higher going into the meal, give 8 units.   If glucose is 320 or higher going into the meal, give 9 units     For low blood sugar - remember to keep glucose tablets on hand. You can purchase these at the pharmacy check out desk/over the counter.   If blood sugar is less than 70, take/eat 2 - 3 tablets quickly to bring your sugar up.   Always keep 15 grams of Quick acting carbohydrates on hand to eat/drink if your sugar is low - examples are 1/2 cup of juice, coke, or crackers, granola bars.   Remember to eat meals frequently to prevent low blood blood sugars.      Pump discussion/consideration:   Ilet - bionic pancreas   Or the omnipod 5 -   Both of these sync with the dexcom

## 2024-01-04 ENCOUNTER — OFFICE VISIT (OUTPATIENT)
Dept: UROLOGY | Facility: CLINIC | Age: 34
End: 2024-01-04
Payer: COMMERCIAL

## 2024-01-04 ENCOUNTER — LAB VISIT (OUTPATIENT)
Dept: LAB | Facility: HOSPITAL | Age: 34
End: 2024-01-04
Payer: COMMERCIAL

## 2024-01-04 VITALS
WEIGHT: 185.19 LBS | HEIGHT: 71 IN | DIASTOLIC BLOOD PRESSURE: 84 MMHG | BODY MASS INDEX: 25.93 KG/M2 | HEART RATE: 101 BPM | SYSTOLIC BLOOD PRESSURE: 135 MMHG

## 2024-01-04 DIAGNOSIS — E11.59 TYPE 2 DIABETES MELLITUS WITH CIRCULATORY DISORDER CAUSING ERECTILE DYSFUNCTION: ICD-10-CM

## 2024-01-04 DIAGNOSIS — N52.1 TYPE 2 DIABETES MELLITUS WITH CIRCULATORY DISORDER CAUSING ERECTILE DYSFUNCTION: Primary | ICD-10-CM

## 2024-01-04 DIAGNOSIS — N52.9 ERECTILE DYSFUNCTION, UNSPECIFIED ERECTILE DYSFUNCTION TYPE: ICD-10-CM

## 2024-01-04 DIAGNOSIS — E11.59 TYPE 2 DIABETES MELLITUS WITH CIRCULATORY DISORDER CAUSING ERECTILE DYSFUNCTION: Primary | ICD-10-CM

## 2024-01-04 DIAGNOSIS — R73.09 ELEVATED HEMOGLOBIN A1C: ICD-10-CM

## 2024-01-04 DIAGNOSIS — N52.1 TYPE 2 DIABETES MELLITUS WITH CIRCULATORY DISORDER CAUSING ERECTILE DYSFUNCTION: ICD-10-CM

## 2024-01-04 LAB — TESTOST SERPL-MCNC: 559 NG/DL (ref 304–1227)

## 2024-01-04 PROCEDURE — 3008F BODY MASS INDEX DOCD: CPT | Mod: CPTII,S$GLB,, | Performed by: NURSE PRACTITIONER

## 2024-01-04 PROCEDURE — 1159F MED LIST DOCD IN RCRD: CPT | Mod: CPTII,S$GLB,, | Performed by: NURSE PRACTITIONER

## 2024-01-04 PROCEDURE — 1160F RVW MEDS BY RX/DR IN RCRD: CPT | Mod: CPTII,S$GLB,, | Performed by: NURSE PRACTITIONER

## 2024-01-04 PROCEDURE — 3075F SYST BP GE 130 - 139MM HG: CPT | Mod: CPTII,S$GLB,, | Performed by: NURSE PRACTITIONER

## 2024-01-04 PROCEDURE — 84403 ASSAY OF TOTAL TESTOSTERONE: CPT | Performed by: NURSE PRACTITIONER

## 2024-01-04 PROCEDURE — 99204 OFFICE O/P NEW MOD 45 MIN: CPT | Mod: S$GLB,,, | Performed by: NURSE PRACTITIONER

## 2024-01-04 PROCEDURE — 36415 COLL VENOUS BLD VENIPUNCTURE: CPT | Performed by: NURSE PRACTITIONER

## 2024-01-04 PROCEDURE — 3079F DIAST BP 80-89 MM HG: CPT | Mod: CPTII,S$GLB,, | Performed by: NURSE PRACTITIONER

## 2024-01-04 PROCEDURE — 3072F LOW RISK FOR RETINOPATHY: CPT | Mod: CPTII,S$GLB,, | Performed by: NURSE PRACTITIONER

## 2024-01-04 PROCEDURE — 99999 PR PBB SHADOW E&M-EST. PATIENT-LVL V: CPT | Mod: PBBFAC,,, | Performed by: NURSE PRACTITIONER

## 2024-01-04 RX ORDER — TADALAFIL 20 MG/1
20 TABLET ORAL DAILY PRN
Qty: 8 TABLET | Refills: 11 | Status: SHIPPED | OUTPATIENT
Start: 2024-01-04 | End: 2025-01-03

## 2024-01-04 NOTE — PROGRESS NOTES
CHIEF COMPLAINT:    Nader Cardona is a 33 y.o. male presents today for ED.     HISTORY OF PRESENTING ILLINESS:    Nader Cardona is a 33 y.o. Type 2 DIABETIC male new to our [facility]. This is a new patient to for me. I personally reviewed their recent medical records as well as their outside medical, surgical, family, & social history.     He is here today to discuss ED. This has been an issue for > 1 year. He able to achieve a semi erection but unable to penetrate. He has tried & failed Sildenafil 100mg from [facility]. He has never tried any other ED meds.     He denies any urinary issues.   No family history of Prostate Cancer.     12/05/2023 Hgb A1c was 7.3; 09/12/2023 it was 10.5      REVIEW OF SYSTEMS:  Review of Systems   Constitutional: Negative.  Negative for chills and fever.   Eyes:  Negative for double vision.   Respiratory:  Negative for cough and shortness of breath.    Cardiovascular:  Negative for chest pain.   Gastrointestinal:  Negative for abdominal pain, constipation, diarrhea, nausea and vomiting.   Genitourinary: Negative.  Negative for dysuria, flank pain and hematuria.        No urinary complaints     Neurological:  Negative for dizziness and seizures.   Endo/Heme/Allergies:  Negative for polydipsia.         PATIENT HISTORY:    Past Medical History:   Diagnosis Date    Asthma     Diabetes mellitus, type 2        Past Surgical History:   Procedure Laterality Date    gynecomastia surgery      right knee surgery         Family History   Problem Relation Age of Onset    Cancer Mother         Breast Cancer    Cancer Father         Stage 4 lung cancer and throat cancer    Glaucoma Maternal Grandfather        Social History     Socioeconomic History    Marital status: Single   Tobacco Use    Smoking status: Never    Smokeless tobacco: Never   Substance and Sexual Activity    Alcohol use: Yes     Comment: Social    Drug use: Never     Social Determinants of Health     Financial Resource Strain:  Medium Risk (1/4/2024)    Overall Financial Resource Strain (CARDIA)     Difficulty of Paying Living Expenses: Somewhat hard   Food Insecurity: No Food Insecurity (1/4/2024)    Hunger Vital Sign     Worried About Running Out of Food in the Last Year: Never true     Ran Out of Food in the Last Year: Never true   Transportation Needs: No Transportation Needs (1/4/2024)    PRAPARE - Transportation     Lack of Transportation (Medical): No     Lack of Transportation (Non-Medical): No   Physical Activity: Inactive (1/4/2024)    Exercise Vital Sign     Days of Exercise per Week: 5 days     Minutes of Exercise per Session: 0 min   Stress: Stress Concern Present (1/4/2024)    [employer] - Occupational Stress Questionnaire     Feeling of Stress : Very much   Social Connections: Unknown (1/4/2024)    Social Connection and Isolation Panel [NHANES]     Frequency of Communication with Friends and Family: More than three times a week     Frequency of Social Gatherings with Friends and Family: Three times a week     Active Member of Clubs or Organizations: No     Attends Club or Organization Meetings: Never     Marital Status: Never    Housing Stability: Low Risk  (1/4/2024)    Housing Stability Vital Sign     Unable to Pay for Housing in the Last Year: No     Number of Places Lived in the Last Year: 1     Unstable Housing in the Last Year: No       Allergies:  Patient has no known allergies.    Medications:    Current Outpatient Medications:     atorvastatin (LIPITOR) 10 MG tablet, Take 1 tablet (10 mg total) by mouth once daily., Disp: 90 tablet, Rfl: 3    blood sugar diagnostic (ONETOUCH VERIO TEST STRIPS) Strp, 1 each by Misc.(Non-Drug; Combo Route) route 2 (two) times daily before meals., Disp: 200 each, Rfl: 1    blood-glucose meter kit, Checks blood sugars 2x/daily., Disp: 1 each, Rfl: 0    blood-glucose meter kit, Use as instructed, Disp: 1 each, Rfl: 0    blood-glucose sensor (DEXCOM G7  "SENSOR) Jazmin, 1 each by Misc.(Non-Drug; Combo Route) route every 10 days., Disp: 3 each, Rfl: 11    insulin aspart U-100 (NOVOLOG FLEXPEN U-100 INSULIN) 100 unit/mL (3 mL) InPn pen, Inject 4 Units into the skin before meals as needed (If glucose is 120 or higher going into the meal, give 4 units, if 160 >, give 5 units.  If 200 >, give 6 units.  240 or higher >, give 7 units, If bg is 280 or >, give 8 units,  320 or >,  give 9 units). Max TDD is 30 units per day., Disp: 15 mL, Rfl: 3    insulin detemir U-100, Levemir, (LEVEMIR FLEXPEN) 100 unit/mL (3 mL) InPn pen, Inject 30 Units into the skin every evening., Disp: 15 mL, Rfl: 3    lancets (LANCETS,THIN) Misc, 1 each by Misc.(Non-Drug; Combo Route) route 2 (two) times daily before meals., Disp: 200 each, Rfl: 3    pen needle, diabetic 32 gauge x 5/32" Ndle, Inject 1 each into the skin 4 (four) times daily., Disp: 200 each, Rfl: 3    tirzepatide (MOUNJARO) 5 mg/0.5 mL PnIj, Inject 5 mg into the skin every 7 days., Disp: , Rfl:     tadalafiL (CIALIS) 20 MG Tab, Take 1 tablet (20 mg total) by mouth daily as needed (take 30-60 minutes before on an empty stomach)., Disp: 8 tablet, Rfl: 11    PHYSICAL EXAMINATION:  Physical Exam  Vitals and nursing note reviewed.   Constitutional:       General: He is awake.      Appearance: Normal appearance.   HENT:      Head: Normocephalic.      Right Ear: External ear normal.      Left Ear: External ear normal.      Nose: Nose normal.   Cardiovascular:      Rate and Rhythm: Normal rate.   Pulmonary:      Effort: Pulmonary effort is normal. No respiratory distress.   Abdominal:      Tenderness: There is no abdominal tenderness. There is no right CVA tenderness or left CVA tenderness.   Genitourinary:     Penis: Normal and circumcised. No hypospadias.       Testes: Normal. Cremasteric reflex is present.         Right: Mass, tenderness or swelling not present.         Left: Mass, tenderness or swelling not present.   Musculoskeletal:    " "     General: Normal range of motion.      Cervical back: Normal range of motion.   Lymphadenopathy:      Lower Body: No right inguinal adenopathy. No left inguinal adenopathy.   Skin:     General: Skin is warm and dry.   Neurological:      General: No focal deficit present.      Mental Status: He is alert and oriented to person, place, and time.   Psychiatric:         Mood and Affect: Mood normal.         Behavior: Behavior is cooperative.           LABS:      No results found for: "PSA", "PSADIAG", "PSATOTAL", "PHIND"    Lab Results   Component Value Date    CREATININE 0.8 12/05/2023    EGFRNORACEVR >60.0 12/05/2023             IMPRESSION:    Encounter Diagnoses   Name Primary?    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 Erectile dysfunction, unspecified erectile dysfunction type    3. Elevated hemoglobin A1c        Plan:         I spent 45 minutes with the patient of which more than half was spent in direct consultation with the patient in regards to our treatment and plan.  We addressed the office findings and recent labs.   Education and recommendations of today's plan of care including home remedies and needed follow up with PCP.   We discussed his ED and the contributory factors; especially the uncontrolled Diabetes  Follow up with PCP for underlying medical conditions causing ED.   We discussed the physiological as well as his psychological aspects that contribute to ED.  Reviewed the 1st, 2nd, & 3rd line therapies for ED.  The benefits, expectations risks, se of the different therapies.  Encouraged to take on an empty stomach 30-60 minutes prior to interaction.   He was informed that for the [facility] we have rep that comes to clinic for him discuss.  The rep is " coming next Friday.   If interested in ICI therapy, medication comes from a compound pharmacy and the 1st dose has to be done under medical supervision.  This is done due to high risk for priapism.  Educational materials given.  Check T level today  Rx for Cialis 20mg PRN.   OTC L-Arginine 1000mg daily.   We reviewed new meds; discussed the reason, benefits, expectations as well as risks, possible side effects.   Any concerns then stop taking and let me know.  Recommended lifestyle modifications with a proper, healthy DIABETIC diet, good hydration but during the day. Reducing bladder irritants.   Benefits of regular exercise.

## 2024-01-11 DIAGNOSIS — E11.9 TYPE 2 DIABETES MELLITUS WITHOUT COMPLICATION, WITHOUT LONG-TERM CURRENT USE OF INSULIN: Primary | ICD-10-CM

## 2024-01-11 RX ORDER — INSULIN DETEMIR 100 [IU]/ML
30 INJECTION, SOLUTION SUBCUTANEOUS NIGHTLY
Qty: 15 ML | Refills: 3 | Status: SHIPPED | OUTPATIENT
Start: 2024-01-11

## 2024-01-11 RX ORDER — INSULIN ASPART 100 [IU]/ML
4 INJECTION, SOLUTION INTRAVENOUS; SUBCUTANEOUS
Qty: 15 ML | Refills: 3 | Status: SHIPPED | OUTPATIENT
Start: 2024-01-11

## 2024-01-11 RX ORDER — PEN NEEDLE, DIABETIC 30 GX3/16"
1 NEEDLE, DISPOSABLE MISCELLANEOUS 4 TIMES DAILY
Qty: 200 EACH | Refills: 3 | Status: SHIPPED | OUTPATIENT
Start: 2024-01-11

## 2024-04-19 ENCOUNTER — PATIENT MESSAGE (OUTPATIENT)
Dept: ADMINISTRATIVE | Facility: HOSPITAL | Age: 34
End: 2024-04-19
Payer: COMMERCIAL

## 2024-09-25 DIAGNOSIS — E11.9 TYPE 2 DIABETES MELLITUS WITHOUT COMPLICATION: ICD-10-CM

## 2024-12-31 DIAGNOSIS — E11.9 TYPE 2 DIABETES MELLITUS WITHOUT COMPLICATION: ICD-10-CM

## 2024-12-31 DIAGNOSIS — E78.5 HYPERLIPIDEMIA ASSOCIATED WITH TYPE 2 DIABETES MELLITUS: ICD-10-CM

## 2024-12-31 DIAGNOSIS — E11.69 HYPERLIPIDEMIA ASSOCIATED WITH TYPE 2 DIABETES MELLITUS: ICD-10-CM

## 2025-02-17 ENCOUNTER — PATIENT MESSAGE (OUTPATIENT)
Dept: ADMINISTRATIVE | Facility: HOSPITAL | Age: 35
End: 2025-02-17
Payer: COMMERCIAL

## 2025-02-21 ENCOUNTER — PATIENT OUTREACH (OUTPATIENT)
Dept: ADMINISTRATIVE | Facility: HOSPITAL | Age: 35
End: 2025-02-21
Payer: COMMERCIAL

## 2025-02-21 DIAGNOSIS — E11.9 TYPE 2 DIABETES MELLITUS WITHOUT COMPLICATION, WITH LONG-TERM CURRENT USE OF INSULIN: Primary | ICD-10-CM

## 2025-02-21 DIAGNOSIS — Z13.6 ENCOUNTER FOR SCREENING FOR CARDIOVASCULAR DISORDERS: ICD-10-CM

## 2025-02-21 DIAGNOSIS — Z79.4 TYPE 2 DIABETES MELLITUS WITHOUT COMPLICATION, WITH LONG-TERM CURRENT USE OF INSULIN: Primary | ICD-10-CM

## 2025-02-26 ENCOUNTER — PATIENT OUTREACH (OUTPATIENT)
Dept: ADMINISTRATIVE | Facility: HOSPITAL | Age: 35
End: 2025-02-26
Payer: COMMERCIAL